# Patient Record
Sex: FEMALE | Race: WHITE | NOT HISPANIC OR LATINO | Employment: OTHER
[De-identification: names, ages, dates, MRNs, and addresses within clinical notes are randomized per-mention and may not be internally consistent; named-entity substitution may affect disease eponyms.]

---

## 2017-01-27 ENCOUNTER — HOSPITAL (OUTPATIENT)
Dept: OTHER | Age: 72
End: 2017-01-27
Attending: INTERNAL MEDICINE

## 2017-07-10 PROBLEM — E11.9 DIABETES MELLITUS TYPE 2 WITHOUT RETINOPATHY (HCC): Status: ACTIVE | Noted: 2017-07-10

## 2017-07-10 PROBLEM — D31.31 CHOROIDAL NEVUS OF RIGHT EYE: Status: ACTIVE | Noted: 2017-07-10

## 2017-07-10 PROBLEM — H25.813 COMBINED FORMS OF AGE-RELATED CATARACT OF BOTH EYES: Status: ACTIVE | Noted: 2017-07-10

## 2017-07-21 PROCEDURE — 82043 UR ALBUMIN QUANTITATIVE: CPT | Performed by: INTERNAL MEDICINE

## 2017-07-21 PROCEDURE — 82607 VITAMIN B-12: CPT | Performed by: INTERNAL MEDICINE

## 2017-07-21 PROCEDURE — 82570 ASSAY OF URINE CREATININE: CPT | Performed by: INTERNAL MEDICINE

## 2017-09-19 PROBLEM — N32.81 OAB (OVERACTIVE BLADDER): Status: ACTIVE | Noted: 2017-09-19

## 2017-09-19 PROBLEM — N39.41 URGE INCONTINENCE OF URINE: Status: ACTIVE | Noted: 2017-09-19

## 2017-10-03 PROBLEM — D31.31 CHOROIDAL NEVUS OF RIGHT EYE: Status: RESOLVED | Noted: 2017-07-10 | Resolved: 2017-10-03

## 2017-10-03 PROBLEM — N39.41 URGE INCONTINENCE OF URINE: Status: RESOLVED | Noted: 2017-09-19 | Resolved: 2017-10-03

## 2017-10-03 PROBLEM — H25.813 COMBINED FORMS OF AGE-RELATED CATARACT OF BOTH EYES: Status: RESOLVED | Noted: 2017-07-10 | Resolved: 2017-10-03

## 2018-02-26 ENCOUNTER — HOSPITAL (OUTPATIENT)
Dept: OTHER | Age: 73
End: 2018-02-26
Attending: INTERNAL MEDICINE

## 2018-08-09 PROBLEM — H02.403 PTOSIS OF BOTH EYELIDS: Status: ACTIVE | Noted: 2018-08-09

## 2018-08-22 PROCEDURE — 82570 ASSAY OF URINE CREATININE: CPT | Performed by: INTERNAL MEDICINE

## 2018-08-22 PROCEDURE — 82043 UR ALBUMIN QUANTITATIVE: CPT | Performed by: INTERNAL MEDICINE

## 2018-11-20 PROBLEM — N76.3 CHRONIC VULVITIS: Status: ACTIVE | Noted: 2018-11-20

## 2019-04-08 ENCOUNTER — HOSPITAL (OUTPATIENT)
Dept: OTHER | Age: 74
End: 2019-04-08
Attending: INTERNAL MEDICINE

## 2019-08-01 PROBLEM — H02.403 PTOSIS OF BOTH EYELIDS: Status: RESOLVED | Noted: 2018-08-09 | Resolved: 2019-08-01

## 2019-08-01 PROBLEM — N76.3 CHRONIC VULVITIS: Status: RESOLVED | Noted: 2018-11-20 | Resolved: 2019-08-01

## 2019-08-01 PROBLEM — D31.31 CHOROIDAL NEVUS OF RIGHT EYE: Status: RESOLVED | Noted: 2017-07-10 | Resolved: 2019-08-01

## 2020-06-29 PROBLEM — M16.11 PRIMARY OSTEOARTHRITIS OF RIGHT HIP: Status: ACTIVE | Noted: 2020-06-29

## 2020-06-29 PROBLEM — M25.551 ACUTE PAIN OF RIGHT HIP: Status: ACTIVE | Noted: 2020-06-29

## 2020-06-29 PROBLEM — M54.41 CHRONIC RIGHT-SIDED LOW BACK PAIN WITH RIGHT-SIDED SCIATICA: Status: ACTIVE | Noted: 2020-06-29

## 2020-06-29 PROBLEM — M43.10 SPONDYLOLISTHESIS, GRADE 1: Status: ACTIVE | Noted: 2020-06-29

## 2020-06-29 PROBLEM — G89.29 CHRONIC RIGHT-SIDED LOW BACK PAIN WITH RIGHT-SIDED SCIATICA: Status: ACTIVE | Noted: 2020-06-29

## 2020-08-14 PROBLEM — M25.551 ACUTE PAIN OF RIGHT HIP: Status: RESOLVED | Noted: 2020-06-29 | Resolved: 2020-08-14

## 2020-08-14 PROBLEM — H25.813 COMBINED FORMS OF AGE-RELATED CATARACT OF BOTH EYES: Status: RESOLVED | Noted: 2017-07-10 | Resolved: 2020-08-14

## 2020-08-18 DIAGNOSIS — Z12.31 ENCOUNTER FOR SCREENING MAMMOGRAM FOR MALIGNANT NEOPLASM OF BREAST: Primary | ICD-10-CM

## 2020-08-29 ENCOUNTER — HOSPITAL ENCOUNTER (OUTPATIENT)
Dept: MAMMOGRAPHY | Age: 75
Discharge: HOME | End: 2020-08-29

## 2020-08-29 DIAGNOSIS — Z12.31 ENCOUNTER FOR SCREENING MAMMOGRAM FOR MALIGNANT NEOPLASM OF BREAST: ICD-10-CM

## 2020-08-29 PROCEDURE — 77063 BREAST TOMOSYNTHESIS BI: CPT

## 2020-09-03 ENCOUNTER — HOSPITAL ENCOUNTER (OUTPATIENT)
Dept: MAMMOGRAPHY | Age: 75
Discharge: HOME | End: 2020-09-03

## 2020-09-03 ENCOUNTER — HOSPITAL ENCOUNTER (OUTPATIENT)
Dept: ULTRASOUND IMAGING | Age: 75
Discharge: HOME | End: 2020-09-03

## 2020-09-03 DIAGNOSIS — N64.89 BREAST ASYMMETRY: ICD-10-CM

## 2020-09-03 DIAGNOSIS — R92.8 ABNORMAL MAMMOGRAM: ICD-10-CM

## 2020-09-03 PROCEDURE — G0279 TOMOSYNTHESIS, MAMMO: HCPCS

## 2020-09-03 PROCEDURE — 76642 ULTRASOUND BREAST LIMITED: CPT

## 2020-09-09 ENCOUNTER — HOSPITAL ENCOUNTER (OUTPATIENT)
Dept: ULTRASOUND IMAGING | Age: 75
Discharge: HOME | End: 2020-09-09

## 2020-09-09 ENCOUNTER — HOSPITAL ENCOUNTER (OUTPATIENT)
Dept: MAMMOGRAPHY | Age: 75
Discharge: HOME | End: 2020-09-09

## 2020-09-09 DIAGNOSIS — N63.20 LEFT BREAST MASS: ICD-10-CM

## 2020-09-09 PROCEDURE — A4648 IMPLANTABLE TISSUE MARKER: HCPCS

## 2020-09-09 PROCEDURE — 10006027 HB SUPPLY 278

## 2020-09-09 PROCEDURE — 88341 IMHCHEM/IMCYTCHM EA ADD ANTB: CPT

## 2020-09-09 PROCEDURE — 88360 TUMOR IMMUNOHISTOCHEM/MANUAL: CPT

## 2020-09-09 PROCEDURE — 19083 BX BREAST 1ST LESION US IMAG: CPT

## 2020-09-09 PROCEDURE — 77065 DX MAMMO INCL CAD UNI: CPT

## 2020-09-09 PROCEDURE — 10006023 HB SUPPLY 272

## 2020-09-09 PROCEDURE — 88305 TISSUE EXAM BY PATHOLOGIST: CPT

## 2020-09-09 PROCEDURE — 88342 IMHCHEM/IMCYTCHM 1ST ANTB: CPT

## 2020-09-09 PROCEDURE — 88377 M/PHMTRC ALYS ISHQUANT/SEMIQ: CPT

## 2020-09-09 RX ORDER — LIDOCAINE HYDROCHLORIDE 20 MG/ML
3 INJECTION, SOLUTION INFILTRATION; PERINEURAL ONCE
Status: DISCONTINUED | OUTPATIENT
Start: 2020-09-09 | End: 2020-09-11 | Stop reason: HOSPADM

## 2020-09-14 ENCOUNTER — TELEPHONE (OUTPATIENT)
Dept: MAMMOGRAPHY | Age: 75
End: 2020-09-14

## 2020-09-14 ENCOUNTER — TELEPHONE (OUTPATIENT)
Dept: ONCOLOGY | Age: 75
End: 2020-09-14

## 2020-09-15 LAB — GENETICS REPORT: NORMAL

## 2020-09-18 LAB — PATHOLOGIST NAME: NORMAL

## 2020-10-28 ENCOUNTER — LAB SERVICES (OUTPATIENT)
Dept: LAB | Age: 75
End: 2020-10-28

## 2020-10-28 DIAGNOSIS — Z01.812 PRE-PROCEDURAL LABORATORY EXAMINATIONS: ICD-10-CM

## 2020-10-29 LAB
SARS-COV-2 RNA RESP QL NAA+PROBE: NOT DETECTED
SERVICE CMNT-IMP: NORMAL
SPECIMEN SOURCE: NORMAL

## 2020-10-29 RX ORDER — ATORVASTATIN CALCIUM 20 MG/1
20 TABLET, FILM COATED ORAL DAILY
COMMUNITY

## 2020-10-29 RX ORDER — INDAPAMIDE 2.5 MG/1
2.5 TABLET ORAL EVERY MORNING
COMMUNITY

## 2020-10-29 RX ORDER — OLMESARTAN MEDOXOMIL 20 MG/1
20 TABLET ORAL DAILY
COMMUNITY

## 2020-10-29 RX ORDER — MULTIVIT-MIN/IRON/FOLIC ACID/K 18-600-40
100 CAPSULE ORAL DAILY
COMMUNITY

## 2020-10-29 SDOH — HEALTH STABILITY: MENTAL HEALTH: HOW OFTEN DO YOU HAVE A DRINK CONTAINING ALCOHOL?: NEVER

## 2020-10-29 ASSESSMENT — ACTIVITIES OF DAILY LIVING (ADL)
MOBILITY_ASSIST_DEVICES: CANE;FRONT-WHEELED WALKER
SENSORY_SUPPORT_DEVICES: EYEGLASSES

## 2020-10-29 ASSESSMENT — COGNITIVE AND FUNCTIONAL STATUS - GENERAL: ARE YOU DEAF OR DO YOU HAVE SERIOUS DIFFICULTY  HEARING: YES

## 2020-10-30 ENCOUNTER — APPOINTMENT (OUTPATIENT)
Dept: ULTRASOUND IMAGING | Age: 75
End: 2020-10-30
Attending: SURGERY

## 2020-10-30 ENCOUNTER — ANESTHESIA (OUTPATIENT)
Dept: SURGERY | Age: 75
End: 2020-10-30

## 2020-10-30 ENCOUNTER — APPOINTMENT (OUTPATIENT)
Dept: GENERAL RADIOLOGY | Age: 75
End: 2020-10-30

## 2020-10-30 ENCOUNTER — HOSPITAL ENCOUNTER (OUTPATIENT)
Dept: MAMMOGRAPHY | Age: 75
Discharge: HOME | End: 2020-10-30
Attending: SURGERY

## 2020-10-30 ENCOUNTER — APPOINTMENT (OUTPATIENT)
Dept: MAMMOGRAPHY | Age: 75
End: 2020-10-30
Attending: SURGERY

## 2020-10-30 ENCOUNTER — HOSPITAL ENCOUNTER (OUTPATIENT)
Age: 75
Discharge: HOME | End: 2020-10-30
Attending: SURGERY | Admitting: SURGERY

## 2020-10-30 ENCOUNTER — HOSPITAL ENCOUNTER (OUTPATIENT)
Dept: NUCLEAR MEDICINE | Age: 75
Discharge: HOME | End: 2020-10-30
Attending: SURGERY | Admitting: SURGERY

## 2020-10-30 ENCOUNTER — ANESTHESIA EVENT (OUTPATIENT)
Dept: SURGERY | Age: 75
End: 2020-10-30

## 2020-10-30 VITALS
HEART RATE: 56 BPM | DIASTOLIC BLOOD PRESSURE: 54 MMHG | OXYGEN SATURATION: 94 % | BODY MASS INDEX: 41.71 KG/M2 | RESPIRATION RATE: 16 BRPM | WEIGHT: 226.63 LBS | HEIGHT: 62 IN | SYSTOLIC BLOOD PRESSURE: 133 MMHG | TEMPERATURE: 97.5 F

## 2020-10-30 DIAGNOSIS — Z17.0 MALIGNANT NEOPLASM OF CENTRAL PORTION OF LEFT BREAST IN FEMALE, ESTROGEN RECEPTOR POSITIVE (CMD): ICD-10-CM

## 2020-10-30 DIAGNOSIS — C50.112 MALIGNANT NEOPLASM OF CENTRAL PORTION OF LEFT BREAST IN FEMALE, ESTROGEN RECEPTOR POSITIVE (CMD): ICD-10-CM

## 2020-10-30 DIAGNOSIS — Z85.3 HISTORY OF LEFT BREAST CANCER: ICD-10-CM

## 2020-10-30 DIAGNOSIS — Z85.3 HISTORY OF BREAST CANCER: ICD-10-CM

## 2020-10-30 DIAGNOSIS — Z01.812 PRE-PROCEDURAL LABORATORY EXAMINATIONS: Primary | ICD-10-CM

## 2020-10-30 LAB
GLUCOSE BLDC GLUCOMTR-MCNC: 118 MG/DL (ref 70–99)
GLUCOSE BLDC GLUCOMTR-MCNC: 127 MG/DL (ref 70–99)

## 2020-10-30 PROCEDURE — 82962 GLUCOSE BLOOD TEST: CPT

## 2020-10-30 PROCEDURE — 10002807 HB RX 258

## 2020-10-30 PROCEDURE — 10002803 HB RX 637

## 2020-10-30 PROCEDURE — 13000001 HB PHASE II RECOVERY EA 30 MINUTES: Performed by: SURGERY

## 2020-10-30 PROCEDURE — 13000037 HB COMPLEX CASE EACH ADD MINUTE: Performed by: SURGERY

## 2020-10-30 PROCEDURE — 10002801 HB RX 250 W/O HCPCS: Performed by: SURGERY

## 2020-10-30 PROCEDURE — 10002800 HB RX 250 W HCPCS

## 2020-10-30 PROCEDURE — 77065 DX MAMMO INCL CAD UNI: CPT

## 2020-10-30 PROCEDURE — 76098 X-RAY EXAM SURGICAL SPECIMEN: CPT

## 2020-10-30 PROCEDURE — 10002801 HB RX 250 W/O HCPCS

## 2020-10-30 PROCEDURE — 10002803 HB RX 637: Performed by: SURGERY

## 2020-10-30 PROCEDURE — 88342 IMHCHEM/IMCYTCHM 1ST ANTB: CPT

## 2020-10-30 PROCEDURE — 78195 LYMPH SYSTEM IMAGING: CPT

## 2020-10-30 PROCEDURE — 10006027 HB SUPPLY 278: Performed by: SURGERY

## 2020-10-30 PROCEDURE — 13000036 HB COMPLEX  CASE S/U + 1ST 15 MIN: Performed by: SURGERY

## 2020-10-30 PROCEDURE — 13000003 HB ANESTHESIA  GENERAL EA ADD MINUTE: Performed by: SURGERY

## 2020-10-30 PROCEDURE — 10006023 HB SUPPLY 272: Performed by: SURGERY

## 2020-10-30 PROCEDURE — 13000002 HB ANESTHESIA  GENERAL  S/U + 1ST 15 MIN: Performed by: SURGERY

## 2020-10-30 PROCEDURE — A9520 TC99 TILMANOCEPT DIAG 0.5MCI: HCPCS | Performed by: SURGERY

## 2020-10-30 PROCEDURE — 19281 PERQ DEVICE BREAST 1ST IMAG: CPT

## 2020-10-30 PROCEDURE — 71045 X-RAY EXAM CHEST 1 VIEW: CPT

## 2020-10-30 PROCEDURE — 10004452 HB PACU ADDL 30 MINUTES: Performed by: SURGERY

## 2020-10-30 PROCEDURE — 19285 PERQ DEV BREAST 1ST US IMAG: CPT

## 2020-10-30 PROCEDURE — 10006150 HB RX 343: Performed by: SURGERY

## 2020-10-30 PROCEDURE — 10004451 HB PACU RECOVERY 1ST 30 MINUTES: Performed by: SURGERY

## 2020-10-30 PROCEDURE — 88307 TISSUE EXAM BY PATHOLOGIST: CPT

## 2020-10-30 DEVICE — IMPLANTABLE DEVICE: Type: IMPLANTABLE DEVICE | Site: BREAST | Status: FUNCTIONAL

## 2020-10-30 RX ORDER — SCOLOPAMINE TRANSDERMAL SYSTEM 1 MG/1
1 PATCH, EXTENDED RELEASE TRANSDERMAL PRN
Status: DISCONTINUED | OUTPATIENT
Start: 2020-10-30 | End: 2020-10-30 | Stop reason: HOSPADM

## 2020-10-30 RX ORDER — NICOTINE POLACRILEX 4 MG
30 LOZENGE BUCCAL
Status: DISCONTINUED | OUTPATIENT
Start: 2020-10-30 | End: 2020-10-30 | Stop reason: HOSPADM

## 2020-10-30 RX ORDER — LIDOCAINE HYDROCHLORIDE 20 MG/ML
INJECTION, SOLUTION INFILTRATION; PERINEURAL PRN
Status: DISCONTINUED | OUTPATIENT
Start: 2020-10-30 | End: 2020-10-30

## 2020-10-30 RX ORDER — DEXTROSE MONOHYDRATE 25 G/50ML
25 INJECTION, SOLUTION INTRAVENOUS PRN
Status: DISCONTINUED | OUTPATIENT
Start: 2020-10-30 | End: 2020-10-30 | Stop reason: HOSPADM

## 2020-10-30 RX ORDER — SODIUM CHLORIDE, SODIUM LACTATE, POTASSIUM CHLORIDE, CALCIUM CHLORIDE 600; 310; 30; 20 MG/100ML; MG/100ML; MG/100ML; MG/100ML
INJECTION, SOLUTION INTRAVENOUS CONTINUOUS
Status: DISCONTINUED | OUTPATIENT
Start: 2020-10-30 | End: 2020-10-30 | Stop reason: HOSPADM

## 2020-10-30 RX ORDER — HYDRALAZINE HYDROCHLORIDE 20 MG/ML
5 INJECTION INTRAMUSCULAR; INTRAVENOUS EVERY 10 MIN PRN
Status: DISCONTINUED | OUTPATIENT
Start: 2020-10-30 | End: 2020-10-30 | Stop reason: HOSPADM

## 2020-10-30 RX ORDER — LIDOCAINE HYDROCHLORIDE 10 MG/ML
5-10 INJECTION, SOLUTION INFILTRATION; PERINEURAL PRN
Status: DISCONTINUED | OUTPATIENT
Start: 2020-10-30 | End: 2020-10-30 | Stop reason: HOSPADM

## 2020-10-30 RX ORDER — ONDANSETRON 2 MG/ML
INJECTION INTRAMUSCULAR; INTRAVENOUS PRN
Status: DISCONTINUED | OUTPATIENT
Start: 2020-10-30 | End: 2020-10-30

## 2020-10-30 RX ORDER — SODIUM CHLORIDE, SODIUM LACTATE, POTASSIUM CHLORIDE, CALCIUM CHLORIDE 600; 310; 30; 20 MG/100ML; MG/100ML; MG/100ML; MG/100ML
INJECTION, SOLUTION INTRAVENOUS CONTINUOUS PRN
Status: DISCONTINUED | OUTPATIENT
Start: 2020-10-30 | End: 2020-10-30

## 2020-10-30 RX ORDER — BUPIVACAINE HYDROCHLORIDE 2.5 MG/ML
INJECTION, SOLUTION EPIDURAL; INFILTRATION; INTRACAUDAL PRN
Status: DISCONTINUED | OUTPATIENT
Start: 2020-10-30 | End: 2020-10-30 | Stop reason: HOSPADM

## 2020-10-30 RX ORDER — DIPHENHYDRAMINE HYDROCHLORIDE 50 MG/ML
25 INJECTION INTRAMUSCULAR; INTRAVENOUS
Status: DISCONTINUED | OUTPATIENT
Start: 2020-10-30 | End: 2020-10-30 | Stop reason: HOSPADM

## 2020-10-30 RX ORDER — HUMAN INSULIN 100 [IU]/ML
INJECTION, SOLUTION SUBCUTANEOUS
Status: DISCONTINUED | OUTPATIENT
Start: 2020-10-30 | End: 2020-10-30 | Stop reason: HOSPADM

## 2020-10-30 RX ORDER — ALBUTEROL SULFATE 2.5 MG/3ML
5 SOLUTION RESPIRATORY (INHALATION) ONCE
Status: DISCONTINUED | OUTPATIENT
Start: 2020-10-30 | End: 2020-10-30 | Stop reason: HOSPADM

## 2020-10-30 RX ORDER — FAMOTIDINE 20 MG/1
20 TABLET, FILM COATED ORAL
Status: COMPLETED | OUTPATIENT
Start: 2020-10-30 | End: 2020-10-30

## 2020-10-30 RX ORDER — METOCLOPRAMIDE HYDROCHLORIDE 5 MG/ML
5 INJECTION INTRAMUSCULAR; INTRAVENOUS EVERY 6 HOURS PRN
Status: DISCONTINUED | OUTPATIENT
Start: 2020-10-30 | End: 2020-10-30 | Stop reason: HOSPADM

## 2020-10-30 RX ORDER — 0.9 % SODIUM CHLORIDE 0.9 %
2 VIAL (ML) INJECTION EVERY 12 HOURS SCHEDULED
Status: DISCONTINUED | OUTPATIENT
Start: 2020-10-30 | End: 2020-10-30 | Stop reason: HOSPADM

## 2020-10-30 RX ORDER — PROCHLORPERAZINE EDISYLATE 5 MG/ML
5 INJECTION INTRAMUSCULAR; INTRAVENOUS EVERY 4 HOURS PRN
Status: DISCONTINUED | OUTPATIENT
Start: 2020-10-30 | End: 2020-10-30 | Stop reason: HOSPADM

## 2020-10-30 RX ORDER — ONDANSETRON 2 MG/ML
4 INJECTION INTRAMUSCULAR; INTRAVENOUS ONCE
Status: DISCONTINUED | OUTPATIENT
Start: 2020-10-30 | End: 2020-10-30 | Stop reason: HOSPADM

## 2020-10-30 RX ORDER — MIDAZOLAM HYDROCHLORIDE 1 MG/ML
INJECTION, SOLUTION INTRAMUSCULAR; INTRAVENOUS PRN
Status: DISCONTINUED | OUTPATIENT
Start: 2020-10-30 | End: 2020-10-30

## 2020-10-30 RX ORDER — PROPOFOL 10 MG/ML
INJECTION, EMULSION INTRAVENOUS PRN
Status: DISCONTINUED | OUTPATIENT
Start: 2020-10-30 | End: 2020-10-30

## 2020-10-30 RX ORDER — NALOXONE HCL 0.4 MG/ML
0.2 VIAL (ML) INJECTION EVERY 5 MIN PRN
Status: DISCONTINUED | OUTPATIENT
Start: 2020-10-30 | End: 2020-10-30 | Stop reason: HOSPADM

## 2020-10-30 RX ADMIN — FAMOTIDINE 20 MG: 20 TABLET, FILM COATED ORAL at 11:10

## 2020-10-30 RX ADMIN — CEFAZOLIN SODIUM 2000 MG: 300 INJECTION, POWDER, LYOPHILIZED, FOR SOLUTION INTRAVENOUS at 13:08

## 2020-10-30 RX ADMIN — ONDANSETRON 4 MG: 2 INJECTION INTRAMUSCULAR; INTRAVENOUS at 13:40

## 2020-10-30 RX ADMIN — LIDOCAINE HYDROCHLORIDE 5 ML: 20 INJECTION, SOLUTION INFILTRATION; PERINEURAL at 12:55

## 2020-10-30 RX ADMIN — FENTANYL CITRATE 50 MCG: 50 INJECTION INTRAMUSCULAR; INTRAVENOUS at 14:15

## 2020-10-30 RX ADMIN — SODIUM CHLORIDE, POTASSIUM CHLORIDE, SODIUM LACTATE AND CALCIUM CHLORIDE: 600; 310; 30; 20 INJECTION, SOLUTION INTRAVENOUS at 12:50

## 2020-10-30 RX ADMIN — SODIUM CHLORIDE, POTASSIUM CHLORIDE, SODIUM LACTATE AND CALCIUM CHLORIDE: 600; 310; 30; 20 INJECTION, SOLUTION INTRAVENOUS at 11:10

## 2020-10-30 RX ADMIN — KETOROLAC TROMETHAMINE 30 MG: 30 INJECTION, SOLUTION INTRAMUSCULAR at 15:05

## 2020-10-30 RX ADMIN — PROPOFOL 200 MG: 10 INJECTION, EMULSION INTRAVENOUS at 12:55

## 2020-10-30 RX ADMIN — FENTANYL CITRATE 100 MCG: 50 INJECTION, SOLUTION INTRAMUSCULAR; INTRAVENOUS at 12:56

## 2020-10-30 RX ADMIN — LABETALOL 20 MG/4 ML (5 MG/ML) INTRAVENOUS SYRINGE 5 MG: at 14:04

## 2020-10-30 RX ADMIN — TILMANOCEPT 0.51 MILLICURIE: KIT at 09:20

## 2020-10-30 RX ADMIN — FENTANYL CITRATE 50 MCG: 50 INJECTION, SOLUTION INTRAMUSCULAR; INTRAVENOUS at 13:00

## 2020-10-30 RX ADMIN — MIDAZOLAM HYDROCHLORIDE 2 MG: 1 INJECTION, SOLUTION INTRAMUSCULAR; INTRAVENOUS at 12:52

## 2020-10-30 RX ADMIN — LABETALOL 20 MG/4 ML (5 MG/ML) INTRAVENOUS SYRINGE 5 MG: at 14:14

## 2020-10-30 SDOH — HEALTH STABILITY: MENTAL HEALTH: HOW OFTEN DO YOU HAVE A DRINK CONTAINING ALCOHOL?: NEVER

## 2020-10-30 ASSESSMENT — PAIN SCALES - GENERAL
PAINLEVEL_OUTOF10: 3
PAINLEVEL_OUTOF10: 5
PAINLEVEL_OUTOF10: 4
PAINLEVEL_OUTOF10: 5
PAINLEVEL_OUTOF10: 0
PAINLEVEL_OUTOF10: 5

## 2020-11-04 LAB — PATHOLOGIST NAME: NORMAL

## 2023-12-19 ENCOUNTER — TELEPHONE (OUTPATIENT)
Dept: CARDIOLOGY | Age: 78
End: 2023-12-19

## 2023-12-19 RX ORDER — OLMESARTAN MEDOXOMIL 40 MG/1
20 TABLET ORAL DAILY
Status: ON HOLD | COMMUNITY

## 2023-12-19 RX ORDER — ATORVASTATIN CALCIUM 40 MG/1
40 TABLET, FILM COATED ORAL AT BEDTIME
Status: ON HOLD | COMMUNITY

## 2023-12-19 RX ORDER — FUROSEMIDE 20 MG/1
20 TABLET ORAL DAILY
Status: ON HOLD | COMMUNITY

## 2023-12-19 RX ORDER — CLOTRIMAZOLE AND BETAMETHASONE DIPROPIONATE 10; .64 MG/G; MG/G
1 CREAM TOPICAL 2 TIMES DAILY PRN
Status: ON HOLD | COMMUNITY

## 2023-12-19 RX ORDER — FLUTICASONE PROPIONATE 50 MCG
2 SPRAY, SUSPENSION (ML) NASAL DAILY
Status: ON HOLD | COMMUNITY

## 2023-12-19 RX ORDER — MULTIVIT WITH MINERALS/LUTEIN
1000 TABLET ORAL DAILY
Status: ON HOLD | COMMUNITY

## 2023-12-19 RX ORDER — METOPROLOL TARTRATE 50 MG/1
50 TABLET, FILM COATED ORAL 2 TIMES DAILY
Status: ON HOLD | COMMUNITY
End: 2023-12-21 | Stop reason: HOSPADM

## 2023-12-20 ENCOUNTER — HOSPITAL ENCOUNTER (INPATIENT)
Dept: CARDIOLOGY | Age: 78
LOS: 6 days | Discharge: HOME | DRG: 309 | End: 2023-12-27
Attending: INTERNAL MEDICINE | Admitting: INTERNAL MEDICINE

## 2023-12-20 DIAGNOSIS — I48.92 ATRIAL FLUTTER, UNSPECIFIED TYPE (CMD): ICD-10-CM

## 2023-12-20 PROBLEM — I48.91 ATRIAL FIBRILLATION (CMD): Status: ACTIVE | Noted: 2023-12-20

## 2023-12-20 LAB
ANION GAP SERPL CALC-SCNC: 12 MMOL/L (ref 7–19)
BUN SERPL-MCNC: 26 MG/DL (ref 6–20)
BUN/CREAT SERPL: 20 (ref 7–25)
CALCIUM SERPL-MCNC: 9.1 MG/DL (ref 8.4–10.2)
CHLORIDE SERPL-SCNC: 102 MMOL/L (ref 97–110)
CO2 SERPL-SCNC: 28 MMOL/L (ref 21–32)
CREAT SERPL-MCNC: 1.31 MG/DL (ref 0.51–0.95)
DEPRECATED RDW RBC: 46 FL (ref 39–50)
EGFRCR SERPLBLD CKD-EPI 2021: 41 ML/MIN/{1.73_M2}
ERYTHROCYTE [DISTWIDTH] IN BLOOD: 13.6 % (ref 11–15)
FASTING DURATION TIME PATIENT: ABNORMAL H
GLUCOSE BLDC GLUCOMTR-MCNC: 160 MG/DL (ref 70–99)
GLUCOSE BLDC GLUCOMTR-MCNC: 178 MG/DL (ref 70–99)
GLUCOSE BLDC GLUCOMTR-MCNC: 181 MG/DL (ref 70–99)
GLUCOSE SERPL-MCNC: 173 MG/DL (ref 70–99)
HCT VFR BLD CALC: 45.3 % (ref 36–46.5)
HGB BLD-MCNC: 14.4 G/DL (ref 12–15.5)
LV EF: NORMAL %
MAGNESIUM SERPL-MCNC: 1.8 MG/DL (ref 1.7–2.4)
MCH RBC QN AUTO: 29.1 PG (ref 26–34)
MCHC RBC AUTO-ENTMCNC: 31.8 G/DL (ref 32–36.5)
MCV RBC AUTO: 91.7 FL (ref 78–100)
NRBC BLD MANUAL-RTO: 0 /100 WBC
PLATELET # BLD AUTO: 286 K/MCL (ref 140–450)
POTASSIUM SERPL-SCNC: 4 MMOL/L (ref 3.4–5.1)
RBC # BLD: 4.94 MIL/MCL (ref 4–5.2)
SODIUM SERPL-SCNC: 138 MMOL/L (ref 135–145)
WBC # BLD: 10.7 K/MCL (ref 4.2–11)

## 2023-12-20 PROCEDURE — 10004651 HB RX, NO CHARGE ITEM: Performed by: INTERNAL MEDICINE

## 2023-12-20 PROCEDURE — 96365 THER/PROPH/DIAG IV INF INIT: CPT

## 2023-12-20 PROCEDURE — 93005 ELECTROCARDIOGRAM TRACING: CPT | Performed by: INTERNAL MEDICINE

## 2023-12-20 PROCEDURE — 82962 GLUCOSE BLOOD TEST: CPT

## 2023-12-20 PROCEDURE — 5A2204Z RESTORATION OF CARDIAC RHYTHM, SINGLE: ICD-10-PCS | Performed by: FAMILY MEDICINE

## 2023-12-20 PROCEDURE — 80048 BASIC METABOLIC PNL TOTAL CA: CPT | Performed by: INTERNAL MEDICINE

## 2023-12-20 PROCEDURE — 10002807 HB RX 258: Performed by: INTERNAL MEDICINE

## 2023-12-20 PROCEDURE — 13000001 HB PHASE II RECOVERY EA 30 MINUTES: Performed by: INTERNAL MEDICINE

## 2023-12-20 PROCEDURE — 10002801 HB RX 250 W/O HCPCS: Performed by: INTERNAL MEDICINE

## 2023-12-20 PROCEDURE — 36415 COLL VENOUS BLD VENIPUNCTURE: CPT | Performed by: INTERNAL MEDICINE

## 2023-12-20 PROCEDURE — 10002800 HB RX 250 W HCPCS: Performed by: INTERNAL MEDICINE

## 2023-12-20 PROCEDURE — 96361 HYDRATE IV INFUSION ADD-ON: CPT

## 2023-12-20 PROCEDURE — 85027 COMPLETE CBC AUTOMATED: CPT | Performed by: INTERNAL MEDICINE

## 2023-12-20 PROCEDURE — 10002803 HB RX 637: Performed by: INTERNAL MEDICINE

## 2023-12-20 PROCEDURE — 96366 THER/PROPH/DIAG IV INF ADDON: CPT

## 2023-12-20 PROCEDURE — B24BZZ4 ULTRASONOGRAPHY OF HEART WITH AORTA, TRANSESOPHAGEAL: ICD-10-PCS | Performed by: FAMILY MEDICINE

## 2023-12-20 PROCEDURE — 96375 TX/PRO/DX INJ NEW DRUG ADDON: CPT

## 2023-12-20 PROCEDURE — 99152 MOD SED SAME PHYS/QHP 5/>YRS: CPT | Performed by: INTERNAL MEDICINE

## 2023-12-20 PROCEDURE — 93312 ECHO TRANSESOPHAGEAL: CPT

## 2023-12-20 PROCEDURE — 83735 ASSAY OF MAGNESIUM: CPT | Performed by: INTERNAL MEDICINE

## 2023-12-20 PROCEDURE — 96372 THER/PROPH/DIAG INJ SC/IM: CPT | Performed by: INTERNAL MEDICINE

## 2023-12-20 PROCEDURE — G0378 HOSPITAL OBSERVATION PER HR: HCPCS

## 2023-12-20 RX ORDER — POLYETHYLENE GLYCOL 3350 17 G/17G
17 POWDER, FOR SOLUTION ORAL DAILY PRN
Status: DISCONTINUED | OUTPATIENT
Start: 2023-12-20 | End: 2023-12-27 | Stop reason: HOSPADM

## 2023-12-20 RX ORDER — METFORMIN HYDROCHLORIDE 500 MG/1
500 TABLET, EXTENDED RELEASE ORAL
COMMUNITY

## 2023-12-20 RX ORDER — ONDANSETRON 2 MG/ML
4 INJECTION INTRAMUSCULAR; INTRAVENOUS EVERY 12 HOURS PRN
Status: DISCONTINUED | OUTPATIENT
Start: 2023-12-20 | End: 2023-12-20 | Stop reason: SDUPTHER

## 2023-12-20 RX ORDER — FLUMAZENIL 0.1 MG/ML
INJECTION, SOLUTION INTRAVENOUS
Status: DISCONTINUED
Start: 2023-12-20 | End: 2023-12-20 | Stop reason: WASHOUT

## 2023-12-20 RX ORDER — NALOXONE HCL 0.4 MG/ML
VIAL (ML) INJECTION
Status: DISCONTINUED
Start: 2023-12-20 | End: 2023-12-20 | Stop reason: WASHOUT

## 2023-12-20 RX ORDER — MIDAZOLAM HYDROCHLORIDE 1 MG/ML
INJECTION, SOLUTION INTRAMUSCULAR; INTRAVENOUS
Status: DISPENSED
Start: 2023-12-20 | End: 2023-12-20

## 2023-12-20 RX ORDER — ATORVASTATIN CALCIUM 40 MG/1
40 TABLET, FILM COATED ORAL AT BEDTIME
Status: DISCONTINUED | OUTPATIENT
Start: 2023-12-20 | End: 2023-12-27 | Stop reason: HOSPADM

## 2023-12-20 RX ORDER — LANOLIN ALCOHOL/MO/W.PET/CERES
3 CREAM (GRAM) TOPICAL NIGHTLY PRN
Status: DISCONTINUED | OUTPATIENT
Start: 2023-12-20 | End: 2023-12-27 | Stop reason: HOSPADM

## 2023-12-20 RX ORDER — ACETAMINOPHEN 325 MG/1
650 TABLET ORAL EVERY 4 HOURS PRN
Status: DISCONTINUED | OUTPATIENT
Start: 2023-12-20 | End: 2023-12-27 | Stop reason: HOSPADM

## 2023-12-20 RX ORDER — NICOTINE POLACRILEX 4 MG
15 LOZENGE BUCCAL PRN
Status: DISCONTINUED | OUTPATIENT
Start: 2023-12-20 | End: 2023-12-27 | Stop reason: HOSPADM

## 2023-12-20 RX ORDER — DEXTROSE MONOHYDRATE 25 G/50ML
25 INJECTION, SOLUTION INTRAVENOUS PRN
Status: DISCONTINUED | OUTPATIENT
Start: 2023-12-20 | End: 2023-12-27 | Stop reason: HOSPADM

## 2023-12-20 RX ORDER — DEXTROSE MONOHYDRATE 25 G/50ML
12.5 INJECTION, SOLUTION INTRAVENOUS PRN
Status: DISCONTINUED | OUTPATIENT
Start: 2023-12-20 | End: 2023-12-27 | Stop reason: HOSPADM

## 2023-12-20 RX ORDER — FUROSEMIDE 20 MG/1
20 TABLET ORAL DAILY
Status: DISCONTINUED | OUTPATIENT
Start: 2023-12-21 | End: 2023-12-24

## 2023-12-20 RX ORDER — DIGOXIN 250 MCG
250 TABLET ORAL ONCE
Status: COMPLETED | OUTPATIENT
Start: 2023-12-20 | End: 2023-12-20

## 2023-12-20 RX ORDER — MIDAZOLAM HYDROCHLORIDE 1 MG/ML
INJECTION, SOLUTION INTRAMUSCULAR; INTRAVENOUS PRN
Status: COMPLETED | OUTPATIENT
Start: 2023-12-20 | End: 2023-12-20

## 2023-12-20 RX ORDER — 0.9 % SODIUM CHLORIDE 0.9 %
2 VIAL (ML) INJECTION EVERY 12 HOURS SCHEDULED
Status: DISCONTINUED | OUTPATIENT
Start: 2023-12-20 | End: 2023-12-27 | Stop reason: HOSPADM

## 2023-12-20 RX ORDER — ACETAMINOPHEN 650 MG/1
650 SUPPOSITORY RECTAL EVERY 4 HOURS PRN
Status: DISCONTINUED | OUTPATIENT
Start: 2023-12-20 | End: 2023-12-27 | Stop reason: HOSPADM

## 2023-12-20 RX ORDER — METOPROLOL TARTRATE 1 MG/ML
5 INJECTION, SOLUTION INTRAVENOUS ONCE
Status: COMPLETED | OUTPATIENT
Start: 2023-12-20 | End: 2023-12-20

## 2023-12-20 RX ORDER — ONDANSETRON 2 MG/ML
4 INJECTION INTRAMUSCULAR; INTRAVENOUS EVERY 12 HOURS PRN
Status: DISCONTINUED | OUTPATIENT
Start: 2023-12-20 | End: 2023-12-27 | Stop reason: HOSPADM

## 2023-12-20 RX ORDER — ONDANSETRON 2 MG/ML
INJECTION INTRAMUSCULAR; INTRAVENOUS
Status: DISPENSED
Start: 2023-12-20 | End: 2023-12-21

## 2023-12-20 RX ORDER — ONDANSETRON 4 MG/1
4 TABLET, ORALLY DISINTEGRATING ORAL EVERY 12 HOURS PRN
Status: DISCONTINUED | OUTPATIENT
Start: 2023-12-20 | End: 2023-12-27 | Stop reason: HOSPADM

## 2023-12-20 RX ORDER — NICOTINE POLACRILEX 4 MG
30 LOZENGE BUCCAL PRN
Status: DISCONTINUED | OUTPATIENT
Start: 2023-12-20 | End: 2023-12-27 | Stop reason: HOSPADM

## 2023-12-20 RX ORDER — METOPROLOL TARTRATE 50 MG/1
50 TABLET, FILM COATED ORAL 2 TIMES DAILY
Status: DISCONTINUED | OUTPATIENT
Start: 2023-12-20 | End: 2023-12-21

## 2023-12-20 RX ADMIN — SODIUM CHLORIDE 500 ML: 0.9 INJECTION, SOLUTION INTRAVENOUS at 14:30

## 2023-12-20 RX ADMIN — FENTANYL CITRATE 25 MCG: 50 INJECTION INTRAMUSCULAR; INTRAVENOUS at 10:24

## 2023-12-20 RX ADMIN — FENTANYL CITRATE 25 MCG: 50 INJECTION INTRAMUSCULAR; INTRAVENOUS at 10:07

## 2023-12-20 RX ADMIN — INSULIN LISPRO 1 UNITS: 100 INJECTION, SOLUTION INTRAVENOUS; SUBCUTANEOUS at 18:37

## 2023-12-20 RX ADMIN — AMIODARONE HYDROCHLORIDE 1 MG/MIN: 1.8 INJECTION, SOLUTION INTRAVENOUS at 16:11

## 2023-12-20 RX ADMIN — MIDAZOLAM HYDROCHLORIDE 1 MG: 1 INJECTION, SOLUTION INTRAMUSCULAR; INTRAVENOUS at 10:09

## 2023-12-20 RX ADMIN — MIDAZOLAM HYDROCHLORIDE 1 MG: 1 INJECTION, SOLUTION INTRAMUSCULAR; INTRAVENOUS at 10:24

## 2023-12-20 RX ADMIN — SODIUM CHLORIDE, PRESERVATIVE FREE 2 ML: 5 INJECTION INTRAVENOUS at 19:56

## 2023-12-20 RX ADMIN — METOPROLOL TARTRATE 5 MG: 1 INJECTION, SOLUTION INTRAVENOUS at 11:33

## 2023-12-20 RX ADMIN — AMIODARONE HYDROCHLORIDE 150 MG: 1.5 INJECTION, SOLUTION INTRAVENOUS at 13:58

## 2023-12-20 RX ADMIN — ONDANSETRON 4 MG: 2 INJECTION INTRAMUSCULAR; INTRAVENOUS at 13:24

## 2023-12-20 RX ADMIN — SODIUM CHLORIDE 500 ML: 9 INJECTION, SOLUTION INTRAVENOUS at 18:37

## 2023-12-20 RX ADMIN — SODIUM CHLORIDE, PRESERVATIVE FREE 2 ML: 5 INJECTION INTRAVENOUS at 21:30

## 2023-12-20 RX ADMIN — MIDAZOLAM HYDROCHLORIDE 1 MG: 1 INJECTION, SOLUTION INTRAMUSCULAR; INTRAVENOUS at 10:07

## 2023-12-20 RX ADMIN — DIGOXIN 250 MCG: 250 TABLET ORAL at 22:38

## 2023-12-20 RX ADMIN — FENTANYL CITRATE 25 MCG: 50 INJECTION INTRAMUSCULAR; INTRAVENOUS at 10:09

## 2023-12-20 RX ADMIN — APIXABAN 5 MG: 5 TABLET, FILM COATED ORAL at 21:30

## 2023-12-20 RX ADMIN — ATORVASTATIN CALCIUM 40 MG: 40 TABLET, FILM COATED ORAL at 21:30

## 2023-12-20 RX ADMIN — AMIODARONE HYDROCHLORIDE 1 MG/MIN: 1.8 INJECTION, SOLUTION INTRAVENOUS at 14:12

## 2023-12-20 RX ADMIN — ONDANSETRON 4 MG: 2 INJECTION INTRAMUSCULAR; INTRAVENOUS at 19:53

## 2023-12-20 SDOH — SOCIAL STABILITY: SOCIAL NETWORK
HOW OFTEN DO YOU SEE OR TALK TO PEOPLE THAT YOU CARE ABOUT AND FEEL CLOSE TO? (FOR EXAMPLE: TALKING TO FRIENDS ON THE PHONE, VISITING FRIENDS OR FAMILY, GOING TO CHURCH OR CLUB MEETINGS): PATIENT DECLINED

## 2023-12-20 SDOH — SOCIAL STABILITY: SOCIAL INSECURITY: HOW OFTEN DOES ANYONE, INCLUDING FAMILY AND FRIENDS, SCREAM OR CURSE AT YOU?: NEVER

## 2023-12-20 SDOH — ECONOMIC STABILITY: HOUSING INSECURITY: WHAT IS YOUR LIVING SITUATION TODAY?: I HAVE A STEADY PLACE TO LIVE

## 2023-12-20 SDOH — ECONOMIC STABILITY: GENERAL: WOULD YOU LIKE HELP WITH ANY OF THE FOLLOWING NEEDS?: I DON'T WANT HELP WITH ANY OF THESE

## 2023-12-20 SDOH — ECONOMIC STABILITY: INCOME INSECURITY: IN THE PAST 12 MONTHS, HAS THE ELECTRIC, GAS, OIL, OR WATER COMPANY THREATENED TO SHUT OFF SERVICE IN YOUR HOME?: NO

## 2023-12-20 SDOH — ECONOMIC STABILITY: HOUSING INSECURITY: WHAT IS YOUR LIVING SITUATION TODAY?: HOUSE

## 2023-12-20 SDOH — SOCIAL STABILITY: SOCIAL INSECURITY: HOW OFTEN DOES ANYONE, INCLUDING FAMILY AND FRIENDS, PHYSICALLY HURT YOU?: NEVER

## 2023-12-20 SDOH — SOCIAL STABILITY: SOCIAL NETWORK: SUPPORT SYSTEMS: FAMILY MEMBERS

## 2023-12-20 SDOH — ECONOMIC STABILITY: HOUSING INSECURITY: WHAT IS YOUR LIVING SITUATION TODAY?: ALONE

## 2023-12-20 SDOH — ECONOMIC STABILITY: GENERAL

## 2023-12-20 SDOH — HEALTH STABILITY: PHYSICAL HEALTH: DO YOU HAVE DIFFICULTY DRESSING OR BATHING?: NO

## 2023-12-20 SDOH — HEALTH STABILITY: PHYSICAL HEALTH: DO YOU HAVE SERIOUS DIFFICULTY WALKING OR CLIMBING STAIRS?: NO

## 2023-12-20 SDOH — SOCIAL STABILITY: SOCIAL INSECURITY: HOW OFTEN DOES ANYONE, INCLUDING FAMILY AND FRIENDS, INSULT OR TALK DOWN TO YOU?: NEVER

## 2023-12-20 SDOH — ECONOMIC STABILITY: FOOD INSECURITY: WITHIN THE PAST 12 MONTHS, THE FOOD YOU BOUGHT JUST DIDN'T LAST AND YOU DIDN'T HAVE MONEY TO GET MORE.: NEVER TRUE

## 2023-12-20 SDOH — ECONOMIC STABILITY: HOUSING INSECURITY: DO YOU HAVE PROBLEMS WITH ANY OF THE FOLLOWING?: PATIENT DECLINED

## 2023-12-20 SDOH — ECONOMIC STABILITY: TRANSPORTATION INSECURITY
IN THE PAST 12 MONTHS, HAS LACK OF RELIABLE TRANSPORTATION KEPT YOU FROM MEDICAL APPOINTMENTS, MEETINGS, WORK OR FROM GETTING THINGS NEEDED FOR DAILY LIVING?: NO

## 2023-12-20 SDOH — HEALTH STABILITY: GENERAL: BECAUSE OF A PHYSICAL, MENTAL, OR EMOTIONAL CONDITION, DO YOU HAVE DIFFICULTY DOING ERRANDS ALONE?: NO

## 2023-12-20 SDOH — SOCIAL STABILITY: SOCIAL INSECURITY: HOW OFTEN DOES ANYONE, INCLUDING FAMILY AND FRIENDS, THREATEN YOU WITH HARM?: NEVER

## 2023-12-20 SDOH — HEALTH STABILITY: GENERAL
BECAUSE OF A PHYSICAL, MENTAL, OR EMOTIONAL CONDITION, DO YOU HAVE SERIOUS DIFFICULTY CONCENTRATING, REMEMBERING OR MAKING DECISIONS?: NO

## 2023-12-20 ASSESSMENT — ACTIVITIES OF DAILY LIVING (ADL)
RECENT_DECLINE_ADL: NO
ADL_SHORT_OF_BREATH: YES
ADL_BEFORE_ADMISSION: INDEPENDENT
ADL_SCORE: 12

## 2023-12-20 ASSESSMENT — LIFESTYLE VARIABLES
SMOKING_HISTORY: NO
HOW OFTEN DO YOU HAVE 6 OR MORE DRINKS ON ONE OCCASION: NEVER
HOW MANY STANDARD DRINKS CONTAINING ALCOHOL DO YOU HAVE ON A TYPICAL DAY: 0,1 OR 2
HOW OFTEN DO YOU HAVE A DRINK CONTAINING ALCOHOL: NEVER
ALCOHOL_USE_STATUS: NO OR LOW RISK WITH VALIDATED TOOL
AUDIT-C TOTAL SCORE: 0

## 2023-12-20 ASSESSMENT — PATIENT HEALTH QUESTIONNAIRE - PHQ9
IS PATIENT ABLE TO COMPLETE PHQ2 OR PHQ9: YES
CLINICAL INTERPRETATION OF PHQ2 SCORE: NO FURTHER SCREENING NEEDED
SUM OF ALL RESPONSES TO PHQ9 QUESTIONS 1 AND 2: 0

## 2023-12-20 ASSESSMENT — ORIENTATION MEMORY CONCENTRATION TEST (OMCT)
WHAT TIME IS IT (NO WATCH OR CLOCK): CORRECT
WHAT MONTH IS IT NOW: CORRECT
COUNT BACKWARDS FROM 20 TO 1: CORRECT
WHAT YEAR IS IT NOW (MUST BE EXACT): CORRECT
REPEAT THE NAME AND ADDRESS I ASKED YOU TO REMEMBER: 1 ERROR
OMCT INTERPRETATION: 0-6: NO SIGNIFICANT IMPAIRMENT
OMCT SCORE: 2
SAY THE MONTHS IN REVERSE ORDER STARTING WITH LAST MONTH: CORRECT

## 2023-12-20 ASSESSMENT — COGNITIVE AND FUNCTIONAL STATUS - GENERAL
DO YOU HAVE SERIOUS DIFFICULTY WALKING OR CLIMBING STAIRS: NO
BECAUSE OF A PHYSICAL, MENTAL, OR EMOTIONAL CONDITION, DO YOU HAVE DIFFICULTY DOING ERRANDS ALONE: NO
DO YOU HAVE DIFFICULTY DRESSING OR BATHING: NO
BECAUSE OF A PHYSICAL, MENTAL, OR EMOTIONAL CONDITION, DO YOU HAVE SERIOUS DIFFICULTY CONCENTRATING, REMEMBERING OR MAKING DECISIONS: NO

## 2023-12-20 ASSESSMENT — PAIN SCALES - GENERAL
PAINLEVEL_OUTOF10: 0

## 2023-12-20 ASSESSMENT — PAIN SCALES - WONG BAKER
WONGBAKER_NUMERICALRESPONSE: 0

## 2023-12-21 ENCOUNTER — ANESTHESIA EVENT (OUTPATIENT)
Dept: CARDIOLOGY | Age: 78
End: 2023-12-21

## 2023-12-21 ENCOUNTER — APPOINTMENT (OUTPATIENT)
Dept: CARDIOLOGY | Age: 78
DRG: 309 | End: 2023-12-21
Attending: INTERNAL MEDICINE

## 2023-12-21 ENCOUNTER — ANESTHESIA (OUTPATIENT)
Dept: CARDIOLOGY | Age: 78
End: 2023-12-21

## 2023-12-21 LAB
AMORPH SED URNS QL MICRO: PRESENT
ANION GAP SERPL CALC-SCNC: 13 MMOL/L (ref 7–19)
ANION GAP SERPL CALC-SCNC: 14 MMOL/L (ref 7–19)
APPEARANCE UR: ABNORMAL
ATRIAL RATE (BPM): 278
ATRIAL RATE (BPM): 79
BACTERIA #/AREA URNS HPF: ABNORMAL /HPF
BASOPHILS # BLD: 0.1 K/MCL (ref 0–0.3)
BASOPHILS NFR BLD: 1 %
BILIRUB UR QL STRIP: NEGATIVE
BUN SERPL-MCNC: 32 MG/DL (ref 6–20)
BUN SERPL-MCNC: 39 MG/DL (ref 6–20)
BUN/CREAT SERPL: 15 (ref 7–25)
BUN/CREAT SERPL: 16 (ref 7–25)
CALCIUM SERPL-MCNC: 8.7 MG/DL (ref 8.4–10.2)
CALCIUM SERPL-MCNC: 8.8 MG/DL (ref 8.4–10.2)
CHLORIDE SERPL-SCNC: 101 MMOL/L (ref 97–110)
CHLORIDE SERPL-SCNC: 103 MMOL/L (ref 97–110)
CO2 SERPL-SCNC: 26 MMOL/L (ref 21–32)
CO2 SERPL-SCNC: 26 MMOL/L (ref 21–32)
COLOR UR: YELLOW
CREAT SERPL-MCNC: 1.95 MG/DL (ref 0.51–0.95)
CREAT SERPL-MCNC: 2.52 MG/DL (ref 0.51–0.95)
DEPRECATED RDW RBC: 45.9 FL (ref 39–50)
EGFRCR SERPLBLD CKD-EPI 2021: 19 ML/MIN/{1.73_M2}
EGFRCR SERPLBLD CKD-EPI 2021: 26 ML/MIN/{1.73_M2}
EOSINOPHIL # BLD: 0.1 K/MCL (ref 0–0.5)
EOSINOPHIL NFR BLD: 1 %
ERYTHROCYTE [DISTWIDTH] IN BLOOD: 13.5 % (ref 11–15)
FASTING DURATION TIME PATIENT: ABNORMAL H
FASTING DURATION TIME PATIENT: ABNORMAL H
GLUCOSE BLDC GLUCOMTR-MCNC: 138 MG/DL (ref 70–99)
GLUCOSE BLDC GLUCOMTR-MCNC: 154 MG/DL (ref 70–99)
GLUCOSE BLDC GLUCOMTR-MCNC: 163 MG/DL (ref 70–99)
GLUCOSE SERPL-MCNC: 145 MG/DL (ref 70–99)
GLUCOSE SERPL-MCNC: 177 MG/DL (ref 70–99)
GLUCOSE UR STRIP-MCNC: ABNORMAL MG/DL
HCT VFR BLD CALC: 43.1 % (ref 36–46.5)
HGB BLD-MCNC: 13.6 G/DL (ref 12–15.5)
HGB UR QL STRIP: NEGATIVE
HYALINE CASTS #/AREA URNS LPF: ABNORMAL /LPF
IMM GRANULOCYTES # BLD AUTO: 0.1 K/MCL (ref 0–0.2)
IMM GRANULOCYTES # BLD: 1 %
KETONES UR STRIP-MCNC: NEGATIVE MG/DL
LEUKOCYTE ESTERASE UR QL STRIP: NEGATIVE
LYMPHOCYTES # BLD: 2.3 K/MCL (ref 1–4)
LYMPHOCYTES NFR BLD: 24 %
MAGNESIUM SERPL-MCNC: 1.6 MG/DL (ref 1.7–2.4)
MCH RBC QN AUTO: 28.8 PG (ref 26–34)
MCHC RBC AUTO-ENTMCNC: 31.6 G/DL (ref 32–36.5)
MCV RBC AUTO: 91.1 FL (ref 78–100)
MONOCYTES # BLD: 1.3 K/MCL (ref 0.3–0.9)
MONOCYTES NFR BLD: 13 %
NEUTROPHILS # BLD: 5.8 K/MCL (ref 1.8–7.7)
NEUTROPHILS NFR BLD: 60 %
NITRITE UR QL STRIP: NEGATIVE
NRBC BLD MANUAL-RTO: 0 /100 WBC
P AXIS (DEGREES): 109
P AXIS (DEGREES): 81
PH UR STRIP: 5.5 [PH] (ref 5–7)
PLATELET # BLD AUTO: 249 K/MCL (ref 140–450)
POTASSIUM SERPL-SCNC: 3.9 MMOL/L (ref 3.4–5.1)
POTASSIUM SERPL-SCNC: 3.9 MMOL/L (ref 3.4–5.1)
PR-INTERVAL (MSEC): 134
PR-INTERVAL (MSEC): 154
PROT UR STRIP-MCNC: 300 MG/DL
QRS-INTERVAL (MSEC): 78
QRS-INTERVAL (MSEC): 80
QRS-INTERVAL (MSEC): 80
QRS-INTERVAL (MSEC): 86
QT-INTERVAL (MSEC): 198
QT-INTERVAL (MSEC): 306
QT-INTERVAL (MSEC): 466
QT-INTERVAL (MSEC): 466
QTC: 301
QTC: 481
QTC: 487
QTC: 492
R AXIS (DEGREES): 109
R AXIS (DEGREES): 110
R AXIS (DEGREES): 110
R AXIS (DEGREES): 118
RBC # BLD: 4.73 MIL/MCL (ref 4–5.2)
RBC #/AREA URNS HPF: ABNORMAL /HPF
REPORT TEXT: NORMAL
SODIUM SERPL-SCNC: 136 MMOL/L (ref 135–145)
SODIUM SERPL-SCNC: 139 MMOL/L (ref 135–145)
SODIUM UR-SCNC: <12 MMOL/L
SP GR UR STRIP: 1.02 (ref 1–1.03)
SQUAMOUS #/AREA URNS HPF: ABNORMAL /HPF
T AXIS (DEGREES): -158
T AXIS (DEGREES): 101
T AXIS (DEGREES): 144
T AXIS (DEGREES): 76
TRANS CELLS #/AREA URNS HPF: ABNORMAL /HPF
UROBILINOGEN UR STRIP-MCNC: 0.2 MG/DL
VENTRICULAR RATE EKG/MIN (BPM): 139
VENTRICULAR RATE EKG/MIN (BPM): 152
VENTRICULAR RATE EKG/MIN (BPM): 64
VENTRICULAR RATE EKG/MIN (BPM): 67
WBC # BLD: 9.6 K/MCL (ref 4.2–11)
WBC #/AREA URNS HPF: ABNORMAL /HPF

## 2023-12-21 PROCEDURE — 10002807 HB RX 258: Performed by: INTERNAL MEDICINE

## 2023-12-21 PROCEDURE — 10002800 HB RX 250 W HCPCS: Performed by: STUDENT IN AN ORGANIZED HEALTH CARE EDUCATION/TRAINING PROGRAM

## 2023-12-21 PROCEDURE — 84300 ASSAY OF URINE SODIUM: CPT | Performed by: INTERNAL MEDICINE

## 2023-12-21 PROCEDURE — 96372 THER/PROPH/DIAG INJ SC/IM: CPT | Performed by: INTERNAL MEDICINE

## 2023-12-21 PROCEDURE — 92960 CARDIOVERSION ELECTRIC EXT: CPT

## 2023-12-21 PROCEDURE — 36415 COLL VENOUS BLD VENIPUNCTURE: CPT | Performed by: INTERNAL MEDICINE

## 2023-12-21 PROCEDURE — 10002803 HB RX 637: Performed by: INTERNAL MEDICINE

## 2023-12-21 PROCEDURE — 13000001 HB PHASE II RECOVERY EA 30 MINUTES

## 2023-12-21 PROCEDURE — 10002803 HB RX 637: Performed by: PHYSICIAN ASSISTANT

## 2023-12-21 PROCEDURE — 80048 BASIC METABOLIC PNL TOTAL CA: CPT | Performed by: INTERNAL MEDICINE

## 2023-12-21 PROCEDURE — 10002803 HB RX 637: Performed by: HOSPITALIST

## 2023-12-21 PROCEDURE — 10002800 HB RX 250 W HCPCS: Performed by: INTERNAL MEDICINE

## 2023-12-21 PROCEDURE — 93005 ELECTROCARDIOGRAM TRACING: CPT | Performed by: INTERNAL MEDICINE

## 2023-12-21 PROCEDURE — 96361 HYDRATE IV INFUSION ADD-ON: CPT

## 2023-12-21 PROCEDURE — 10002807 HB RX 258: Performed by: STUDENT IN AN ORGANIZED HEALTH CARE EDUCATION/TRAINING PROGRAM

## 2023-12-21 PROCEDURE — 81001 URINALYSIS AUTO W/SCOPE: CPT | Performed by: INTERNAL MEDICINE

## 2023-12-21 PROCEDURE — 96366 THER/PROPH/DIAG IV INF ADDON: CPT

## 2023-12-21 PROCEDURE — 85025 COMPLETE CBC W/AUTO DIFF WBC: CPT | Performed by: INTERNAL MEDICINE

## 2023-12-21 PROCEDURE — G0378 HOSPITAL OBSERVATION PER HR: HCPCS

## 2023-12-21 PROCEDURE — 10006031 HB ROOM CHARGE TELEMETRY

## 2023-12-21 PROCEDURE — 10004651 HB RX, NO CHARGE ITEM: Performed by: INTERNAL MEDICINE

## 2023-12-21 PROCEDURE — 13000008 HB ANESTHESIA MAC OUTSIDE OR

## 2023-12-21 PROCEDURE — 83735 ASSAY OF MAGNESIUM: CPT | Performed by: INTERNAL MEDICINE

## 2023-12-21 PROCEDURE — 84540 ASSAY OF URINE/UREA-N: CPT | Performed by: INTERNAL MEDICINE

## 2023-12-21 RX ORDER — SODIUM CHLORIDE, SODIUM LACTATE, POTASSIUM CHLORIDE, CALCIUM CHLORIDE 600; 310; 30; 20 MG/100ML; MG/100ML; MG/100ML; MG/100ML
INJECTION, SOLUTION INTRAVENOUS CONTINUOUS PRN
Status: DISCONTINUED | OUTPATIENT
Start: 2023-12-21 | End: 2023-12-21

## 2023-12-21 RX ORDER — SODIUM CHLORIDE 9 MG/ML
INJECTION, SOLUTION INTRAVENOUS CONTINUOUS
Status: DISCONTINUED | OUTPATIENT
Start: 2023-12-21 | End: 2023-12-23

## 2023-12-21 RX ORDER — SENNOSIDES A AND B 8.6 MG/1
1 TABLET, FILM COATED ORAL NIGHTLY PRN
Status: DISCONTINUED | OUTPATIENT
Start: 2023-12-21 | End: 2023-12-27 | Stop reason: HOSPADM

## 2023-12-21 RX ORDER — AMIODARONE HYDROCHLORIDE 200 MG/1
400 TABLET ORAL EVERY 12 HOURS SCHEDULED
Qty: 120 TABLET | Refills: 0 | Status: SHIPPED | OUTPATIENT
Start: 2023-12-21

## 2023-12-21 RX ORDER — PROPOFOL 10 MG/ML
INJECTION, EMULSION INTRAVENOUS PRN
Status: DISCONTINUED | OUTPATIENT
Start: 2023-12-21 | End: 2023-12-21

## 2023-12-21 RX ORDER — AMIODARONE HYDROCHLORIDE 200 MG/1
400 TABLET ORAL EVERY 12 HOURS SCHEDULED
Status: DISCONTINUED | OUTPATIENT
Start: 2023-12-21 | End: 2023-12-22 | Stop reason: SINTOL

## 2023-12-21 RX ORDER — POLYETHYLENE GLYCOL 3350 17 G/17G
17 POWDER, FOR SOLUTION ORAL 2 TIMES DAILY PRN
Status: DISCONTINUED | OUTPATIENT
Start: 2023-12-21 | End: 2023-12-27 | Stop reason: HOSPADM

## 2023-12-21 RX ADMIN — APIXABAN 5 MG: 5 TABLET, FILM COATED ORAL at 06:35

## 2023-12-21 RX ADMIN — SODIUM CHLORIDE 500 ML: 9 INJECTION, SOLUTION INTRAVENOUS at 13:34

## 2023-12-21 RX ADMIN — SODIUM CHLORIDE, PRESERVATIVE FREE 2 ML: 5 INJECTION INTRAVENOUS at 09:44

## 2023-12-21 RX ADMIN — METOPROLOL TARTRATE 25 MG: 25 TABLET, FILM COATED ORAL at 21:16

## 2023-12-21 RX ADMIN — POLYETHYLENE GLYCOL (3350) 17 G: 17 POWDER, FOR SOLUTION ORAL at 22:09

## 2023-12-21 RX ADMIN — SODIUM CHLORIDE: 9 INJECTION, SOLUTION INTRAVENOUS at 23:44

## 2023-12-21 RX ADMIN — ATORVASTATIN CALCIUM 40 MG: 40 TABLET, FILM COATED ORAL at 21:16

## 2023-12-21 RX ADMIN — PROPOFOL 50 MG: 10 INJECTION, EMULSION INTRAVENOUS at 07:49

## 2023-12-21 RX ADMIN — SODIUM CHLORIDE, POTASSIUM CHLORIDE, SODIUM LACTATE AND CALCIUM CHLORIDE: 600; 310; 30; 20 INJECTION, SOLUTION INTRAVENOUS at 07:47

## 2023-12-21 RX ADMIN — INSULIN LISPRO 1 UNITS: 100 INJECTION, SOLUTION INTRAVENOUS; SUBCUTANEOUS at 17:33

## 2023-12-21 RX ADMIN — AMIODARONE HYDROCHLORIDE 1 MG/MIN: 1.8 INJECTION, SOLUTION INTRAVENOUS at 04:48

## 2023-12-21 RX ADMIN — POLYETHYLENE GLYCOL (3350) 17 G: 17 POWDER, FOR SOLUTION ORAL at 09:42

## 2023-12-21 RX ADMIN — APIXABAN 5 MG: 5 TABLET, FILM COATED ORAL at 21:16

## 2023-12-21 RX ADMIN — SODIUM CHLORIDE 500 ML: 9 INJECTION, SOLUTION INTRAVENOUS at 21:34

## 2023-12-21 RX ADMIN — METOPROLOL TARTRATE 25 MG: 25 TABLET, FILM COATED ORAL at 11:00

## 2023-12-21 RX ADMIN — Medication 3 MG: at 21:20

## 2023-12-21 RX ADMIN — MAGNESIUM SULFATE HEPTAHYDRATE 2 G: 40 INJECTION, SOLUTION INTRAVENOUS at 11:05

## 2023-12-21 RX ADMIN — AMIODARONE HYDROCHLORIDE 400 MG: 200 TABLET ORAL at 21:16

## 2023-12-21 RX ADMIN — ONDANSETRON 4 MG: 2 INJECTION INTRAMUSCULAR; INTRAVENOUS at 14:03

## 2023-12-21 RX ADMIN — AMIODARONE HYDROCHLORIDE 400 MG: 200 TABLET ORAL at 09:42

## 2023-12-21 ASSESSMENT — PAIN SCALES - WONG BAKER
WONGBAKER_NUMERICALRESPONSE: 0

## 2023-12-21 ASSESSMENT — PAIN SCALES - GENERAL
PAINLEVEL_OUTOF10: 0

## 2023-12-22 ENCOUNTER — APPOINTMENT (OUTPATIENT)
Dept: GENERAL RADIOLOGY | Age: 78
DRG: 309 | End: 2023-12-22
Attending: INTERNAL MEDICINE

## 2023-12-22 ENCOUNTER — APPOINTMENT (OUTPATIENT)
Dept: ULTRASOUND IMAGING | Age: 78
DRG: 309 | End: 2023-12-22
Attending: INTERNAL MEDICINE

## 2023-12-22 LAB
ALBUMIN SERPL-MCNC: 3.2 G/DL (ref 3.6–5.1)
ALP SERPL-CCNC: 122 UNITS/L (ref 45–117)
ALT SERPL-CCNC: 631 UNITS/L
ANION GAP SERPL CALC-SCNC: 14 MMOL/L (ref 7–19)
AST SERPL-CCNC: 905 UNITS/L
BASOPHILS # BLD: 0.1 K/MCL (ref 0–0.3)
BASOPHILS NFR BLD: 1 %
BILIRUB CONJ SERPL-MCNC: 0.5 MG/DL (ref 0–0.2)
BILIRUB SERPL-MCNC: 1.3 MG/DL (ref 0.2–1)
BUN SERPL-MCNC: 43 MG/DL (ref 6–20)
BUN/CREAT SERPL: 13 (ref 7–25)
CALCIUM SERPL-MCNC: 8.4 MG/DL (ref 8.4–10.2)
CHLORIDE SERPL-SCNC: 103 MMOL/L (ref 97–110)
CO2 SERPL-SCNC: 24 MMOL/L (ref 21–32)
CREAT SERPL-MCNC: 3.21 MG/DL (ref 0.51–0.95)
DEPRECATED RDW RBC: 45.7 FL (ref 39–50)
EGFRCR SERPLBLD CKD-EPI 2021: 14 ML/MIN/{1.73_M2}
EOSINOPHIL # BLD: 0.1 K/MCL (ref 0–0.5)
EOSINOPHIL NFR BLD: 1 %
ERYTHROCYTE [DISTWIDTH] IN BLOOD: 13.8 % (ref 11–15)
FASTING DURATION TIME PATIENT: ABNORMAL H
GLUCOSE BLDC GLUCOMTR-MCNC: 120 MG/DL (ref 70–99)
GLUCOSE BLDC GLUCOMTR-MCNC: 127 MG/DL (ref 70–99)
GLUCOSE BLDC GLUCOMTR-MCNC: 134 MG/DL (ref 70–99)
GLUCOSE BLDC GLUCOMTR-MCNC: 157 MG/DL (ref 70–99)
GLUCOSE SERPL-MCNC: 141 MG/DL (ref 70–99)
HCT VFR BLD CALC: 41.4 % (ref 36–46.5)
HGB BLD-MCNC: 13.2 G/DL (ref 12–15.5)
IMM GRANULOCYTES # BLD AUTO: 0.1 K/MCL (ref 0–0.2)
IMM GRANULOCYTES # BLD: 1 %
LYMPHOCYTES # BLD: 2 K/MCL (ref 1–4)
LYMPHOCYTES NFR BLD: 20 %
MAGNESIUM SERPL-MCNC: 2.1 MG/DL (ref 1.7–2.4)
MCH RBC QN AUTO: 28.8 PG (ref 26–34)
MCHC RBC AUTO-ENTMCNC: 31.9 G/DL (ref 32–36.5)
MCV RBC AUTO: 90.4 FL (ref 78–100)
MONOCYTES # BLD: 1.4 K/MCL (ref 0.3–0.9)
MONOCYTES NFR BLD: 14 %
NEUTROPHILS # BLD: 6.3 K/MCL (ref 1.8–7.7)
NEUTROPHILS NFR BLD: 63 %
NRBC BLD MANUAL-RTO: 0 /100 WBC
PLATELET # BLD AUTO: 263 K/MCL (ref 140–450)
POTASSIUM SERPL-SCNC: 4 MMOL/L (ref 3.4–5.1)
PROT SERPL-MCNC: 6 G/DL (ref 6.4–8.2)
RBC # BLD: 4.58 MIL/MCL (ref 4–5.2)
SODIUM SERPL-SCNC: 137 MMOL/L (ref 135–145)
UUN UR-MCNC: 172 MG/DL
WBC # BLD: 9.9 K/MCL (ref 4.2–11)

## 2023-12-22 PROCEDURE — 10002803 HB RX 637: Performed by: PHYSICIAN ASSISTANT

## 2023-12-22 PROCEDURE — 76705 ECHO EXAM OF ABDOMEN: CPT

## 2023-12-22 PROCEDURE — 80048 BASIC METABOLIC PNL TOTAL CA: CPT | Performed by: INTERNAL MEDICINE

## 2023-12-22 PROCEDURE — 80076 HEPATIC FUNCTION PANEL: CPT | Performed by: INTERNAL MEDICINE

## 2023-12-22 PROCEDURE — 10004651 HB RX, NO CHARGE ITEM: Performed by: INTERNAL MEDICINE

## 2023-12-22 PROCEDURE — 10002803 HB RX 637: Performed by: INTERNAL MEDICINE

## 2023-12-22 PROCEDURE — 36415 COLL VENOUS BLD VENIPUNCTURE: CPT | Performed by: INTERNAL MEDICINE

## 2023-12-22 PROCEDURE — 71045 X-RAY EXAM CHEST 1 VIEW: CPT

## 2023-12-22 PROCEDURE — 10002807 HB RX 258: Performed by: INTERNAL MEDICINE

## 2023-12-22 PROCEDURE — 13003291 HB INS MIDLINE W/GUIDE INCL CATH

## 2023-12-22 PROCEDURE — 10006031 HB ROOM CHARGE TELEMETRY

## 2023-12-22 PROCEDURE — 10002803 HB RX 637: Performed by: HOSPITALIST

## 2023-12-22 PROCEDURE — 83735 ASSAY OF MAGNESIUM: CPT | Performed by: INTERNAL MEDICINE

## 2023-12-22 PROCEDURE — 10004281 HB COUNTER-STAFF TIME PER 15 MIN

## 2023-12-22 PROCEDURE — 85025 COMPLETE CBC W/AUTO DIFF WBC: CPT | Performed by: INTERNAL MEDICINE

## 2023-12-22 PROCEDURE — 76775 US EXAM ABDO BACK WALL LIM: CPT

## 2023-12-22 RX ORDER — 0.9 % SODIUM CHLORIDE 0.9 %
10 VIAL (ML) INJECTION PRN
Status: DISCONTINUED | OUTPATIENT
Start: 2023-12-22 | End: 2023-12-27 | Stop reason: HOSPADM

## 2023-12-22 RX ORDER — 0.9 % SODIUM CHLORIDE 0.9 %
10 VIAL (ML) INJECTION EVERY 12 HOURS SCHEDULED
Status: DISCONTINUED | OUTPATIENT
Start: 2023-12-22 | End: 2023-12-27 | Stop reason: HOSPADM

## 2023-12-22 RX ADMIN — SODIUM CHLORIDE, PRESERVATIVE FREE 10 ML: 5 INJECTION INTRAVENOUS at 20:16

## 2023-12-22 RX ADMIN — APIXABAN 5 MG: 5 TABLET, FILM COATED ORAL at 07:48

## 2023-12-22 RX ADMIN — POLYETHYLENE GLYCOL (3350) 17 G: 17 POWDER, FOR SOLUTION ORAL at 07:48

## 2023-12-22 RX ADMIN — SODIUM CHLORIDE, PRESERVATIVE FREE 2 ML: 5 INJECTION INTRAVENOUS at 20:17

## 2023-12-22 RX ADMIN — AMIODARONE HYDROCHLORIDE 400 MG: 200 TABLET ORAL at 07:48

## 2023-12-22 RX ADMIN — SODIUM CHLORIDE: 9 INJECTION, SOLUTION INTRAVENOUS at 02:57

## 2023-12-22 RX ADMIN — APIXABAN 2.5 MG: 2.5 TABLET, FILM COATED ORAL at 20:16

## 2023-12-22 ASSESSMENT — PAIN SCALES - WONG BAKER
WONGBAKER_NUMERICALRESPONSE: 0

## 2023-12-22 ASSESSMENT — PAIN SCALES - GENERAL
PAINLEVEL_OUTOF10: 0
PAINLEVEL_OUTOF10: 0

## 2023-12-23 LAB
ALBUMIN SERPL-MCNC: 2.2 G/DL (ref 3.6–5.1)
ALBUMIN/GLOB SERPL: 1.2 {RATIO} (ref 1–2.4)
ALP SERPL-CCNC: 93 UNITS/L (ref 45–117)
ALT SERPL-CCNC: 340 UNITS/L
ANION GAP SERPL CALC-SCNC: 11 MMOL/L (ref 7–19)
ANION GAP SERPL CALC-SCNC: 11 MMOL/L (ref 7–19)
AST SERPL-CCNC: 316 UNITS/L
BILIRUB CONJ SERPL-MCNC: 0.3 MG/DL (ref 0–0.2)
BILIRUB SERPL-MCNC: 0.8 MG/DL (ref 0.2–1)
BUN SERPL-MCNC: 39 MG/DL (ref 6–20)
BUN SERPL-MCNC: 48 MG/DL (ref 6–20)
BUN/CREAT SERPL: 15 (ref 7–25)
BUN/CREAT SERPL: 16 (ref 7–25)
CALCIUM SERPL-MCNC: 6.2 MG/DL (ref 8.4–10.2)
CALCIUM SERPL-MCNC: 8.3 MG/DL (ref 8.4–10.2)
CHLORIDE SERPL-SCNC: 104 MMOL/L (ref 97–110)
CHLORIDE SERPL-SCNC: 116 MMOL/L (ref 97–110)
CO2 SERPL-SCNC: 20 MMOL/L (ref 21–32)
CO2 SERPL-SCNC: 26 MMOL/L (ref 21–32)
CREAT SERPL-MCNC: 2.48 MG/DL (ref 0.51–0.95)
CREAT SERPL-MCNC: 3.12 MG/DL (ref 0.51–0.95)
CREAT UR-MCNC: 186 MG/DL
DEPRECATED RDW RBC: 45.1 FL (ref 39–50)
EGFRCR SERPLBLD CKD-EPI 2021: 15 ML/MIN/{1.73_M2}
EGFRCR SERPLBLD CKD-EPI 2021: 19 ML/MIN/{1.73_M2}
ERYTHROCYTE [DISTWIDTH] IN BLOOD: 13.9 % (ref 11–15)
FASTING DURATION TIME PATIENT: ABNORMAL H
FASTING DURATION TIME PATIENT: ABNORMAL H
GLOBULIN SER-MCNC: 1.9 G/DL (ref 2–4)
GLUCOSE BLDC GLUCOMTR-MCNC: 126 MG/DL (ref 70–99)
GLUCOSE BLDC GLUCOMTR-MCNC: 169 MG/DL (ref 70–99)
GLUCOSE BLDC GLUCOMTR-MCNC: 170 MG/DL (ref 70–99)
GLUCOSE BLDC GLUCOMTR-MCNC: 202 MG/DL (ref 70–99)
GLUCOSE SERPL-MCNC: 109 MG/DL (ref 70–99)
GLUCOSE SERPL-MCNC: 185 MG/DL (ref 70–99)
HCT VFR BLD CALC: 39.9 % (ref 36–46.5)
HGB BLD-MCNC: 12.8 G/DL (ref 12–15.5)
MAGNESIUM SERPL-MCNC: 1.7 MG/DL (ref 1.7–2.4)
MAGNESIUM SERPL-MCNC: 2.3 MG/DL (ref 1.7–2.4)
MCH RBC QN AUTO: 28.7 PG (ref 26–34)
MCHC RBC AUTO-ENTMCNC: 32.1 G/DL (ref 32–36.5)
MCV RBC AUTO: 89.5 FL (ref 78–100)
NRBC BLD MANUAL-RTO: 0 /100 WBC
PHOSPHATE SERPL-MCNC: 4.2 MG/DL (ref 2.4–4.7)
PLATELET # BLD AUTO: 222 K/MCL (ref 140–450)
POTASSIUM SERPL-SCNC: 2.9 MMOL/L (ref 3.4–5.1)
POTASSIUM SERPL-SCNC: 4.2 MMOL/L (ref 3.4–5.1)
PROT SERPL-MCNC: 4.1 G/DL (ref 6.4–8.2)
PROT UR-MCNC: 56 MG/DL
RBC # BLD: 4.46 MIL/MCL (ref 4–5.2)
SODIUM SERPL-SCNC: 137 MMOL/L (ref 135–145)
SODIUM SERPL-SCNC: 144 MMOL/L (ref 135–145)
WBC # BLD: 8.6 K/MCL (ref 4.2–11)

## 2023-12-23 PROCEDURE — 10006031 HB ROOM CHARGE TELEMETRY

## 2023-12-23 PROCEDURE — 10002803 HB RX 637: Performed by: INTERNAL MEDICINE

## 2023-12-23 PROCEDURE — 82248 BILIRUBIN DIRECT: CPT | Performed by: PHYSICIAN ASSISTANT

## 2023-12-23 PROCEDURE — 83735 ASSAY OF MAGNESIUM: CPT | Performed by: INTERNAL MEDICINE

## 2023-12-23 PROCEDURE — 82570 ASSAY OF URINE CREATININE: CPT | Performed by: INTERNAL MEDICINE

## 2023-12-23 PROCEDURE — 10004651 HB RX, NO CHARGE ITEM: Performed by: INTERNAL MEDICINE

## 2023-12-23 PROCEDURE — 80048 BASIC METABOLIC PNL TOTAL CA: CPT | Performed by: INTERNAL MEDICINE

## 2023-12-23 PROCEDURE — 10002800 HB RX 250 W HCPCS: Performed by: INTERNAL MEDICINE

## 2023-12-23 PROCEDURE — 80053 COMPREHEN METABOLIC PANEL: CPT | Performed by: INTERNAL MEDICINE

## 2023-12-23 PROCEDURE — 85027 COMPLETE CBC AUTOMATED: CPT | Performed by: INTERNAL MEDICINE

## 2023-12-23 PROCEDURE — 84100 ASSAY OF PHOSPHORUS: CPT | Performed by: INTERNAL MEDICINE

## 2023-12-23 PROCEDURE — 84156 ASSAY OF PROTEIN URINE: CPT | Performed by: INTERNAL MEDICINE

## 2023-12-23 RX ORDER — AMIODARONE HYDROCHLORIDE 200 MG/1
200 TABLET ORAL EVERY 12 HOURS SCHEDULED
Status: DISCONTINUED | OUTPATIENT
Start: 2023-12-23 | End: 2023-12-27 | Stop reason: HOSPADM

## 2023-12-23 RX ORDER — POTASSIUM CHLORIDE 14.9 MG/ML
20 INJECTION INTRAVENOUS ONCE
Status: COMPLETED | OUTPATIENT
Start: 2023-12-23 | End: 2023-12-27

## 2023-12-23 RX ORDER — LANOLIN ALCOHOL/MO/W.PET/CERES
400 CREAM (GRAM) TOPICAL ONCE
Status: COMPLETED | OUTPATIENT
Start: 2023-12-23 | End: 2023-12-23

## 2023-12-23 RX ADMIN — ACETAMINOPHEN 650 MG: 325 TABLET ORAL at 15:09

## 2023-12-23 RX ADMIN — POTASSIUM CHLORIDE 20 MEQ: 14.9 INJECTION, SOLUTION INTRAVENOUS at 13:06

## 2023-12-23 RX ADMIN — APIXABAN 2.5 MG: 2.5 TABLET, FILM COATED ORAL at 20:08

## 2023-12-23 RX ADMIN — Medication 400 MG: at 10:56

## 2023-12-23 RX ADMIN — SODIUM CHLORIDE, PRESERVATIVE FREE 2 ML: 5 INJECTION INTRAVENOUS at 20:09

## 2023-12-23 RX ADMIN — AMIODARONE HYDROCHLORIDE 200 MG: 200 TABLET ORAL at 20:08

## 2023-12-23 RX ADMIN — SODIUM CHLORIDE, PRESERVATIVE FREE 10 ML: 5 INJECTION INTRAVENOUS at 20:09

## 2023-12-23 RX ADMIN — POTASSIUM CHLORIDE 20 MEQ: 14.9 INJECTION, SOLUTION INTRAVENOUS at 10:55

## 2023-12-23 RX ADMIN — APIXABAN 2.5 MG: 2.5 TABLET, FILM COATED ORAL at 08:59

## 2023-12-23 ASSESSMENT — PAIN SCALES - GENERAL
PAINLEVEL_OUTOF10: 0
PAINLEVEL_OUTOF10: 4
PAINLEVEL_OUTOF10: 0

## 2023-12-23 ASSESSMENT — PAIN SCALES - WONG BAKER
WONGBAKER_NUMERICALRESPONSE: 0

## 2023-12-24 ENCOUNTER — APPOINTMENT (OUTPATIENT)
Dept: GENERAL RADIOLOGY | Age: 78
DRG: 309 | End: 2023-12-24
Attending: HOSPITALIST

## 2023-12-24 LAB
ALBUMIN SERPL-MCNC: 2.8 G/DL (ref 3.6–5.1)
ALBUMIN/GLOB SERPL: 0.9 {RATIO} (ref 1–2.4)
ALP SERPL-CCNC: 131 UNITS/L (ref 45–117)
ALT SERPL-CCNC: 367 UNITS/L
ANION GAP SERPL CALC-SCNC: 11 MMOL/L (ref 7–19)
AST SERPL-CCNC: 255 UNITS/L
BASOPHILS # BLD: 0 K/MCL (ref 0–0.3)
BASOPHILS NFR BLD: 1 %
BILIRUB CONJ SERPL-MCNC: 0.4 MG/DL (ref 0–0.2)
BILIRUB SERPL-MCNC: 1.1 MG/DL (ref 0.2–1)
BUN SERPL-MCNC: 46 MG/DL (ref 6–20)
BUN/CREAT SERPL: 17 (ref 7–25)
CALCIUM SERPL-MCNC: 7.9 MG/DL (ref 8.4–10.2)
CHLORIDE SERPL-SCNC: 106 MMOL/L (ref 97–110)
CO2 SERPL-SCNC: 24 MMOL/L (ref 21–32)
CREAT SERPL-MCNC: 2.64 MG/DL (ref 0.51–0.95)
DEPRECATED RDW RBC: 44.8 FL (ref 39–50)
EGFRCR SERPLBLD CKD-EPI 2021: 18 ML/MIN/{1.73_M2}
EOSINOPHIL # BLD: 0.3 K/MCL (ref 0–0.5)
EOSINOPHIL NFR BLD: 4 %
ERYTHROCYTE [DISTWIDTH] IN BLOOD: 13.7 % (ref 11–15)
FASTING DURATION TIME PATIENT: ABNORMAL H
GLOBULIN SER-MCNC: 3 G/DL (ref 2–4)
GLUCOSE BLDC GLUCOMTR-MCNC: 149 MG/DL (ref 70–99)
GLUCOSE BLDC GLUCOMTR-MCNC: 157 MG/DL (ref 70–99)
GLUCOSE BLDC GLUCOMTR-MCNC: 175 MG/DL (ref 70–99)
GLUCOSE BLDC GLUCOMTR-MCNC: 221 MG/DL (ref 70–99)
GLUCOSE SERPL-MCNC: 159 MG/DL (ref 70–99)
HCT VFR BLD CALC: 40.5 % (ref 36–46.5)
HGB BLD-MCNC: 13 G/DL (ref 12–15.5)
IMM GRANULOCYTES # BLD AUTO: 0 K/MCL (ref 0–0.2)
IMM GRANULOCYTES # BLD: 0 %
LYMPHOCYTES # BLD: 1.2 K/MCL (ref 1–4)
LYMPHOCYTES NFR BLD: 17 %
MAGNESIUM SERPL-MCNC: 2.3 MG/DL (ref 1.7–2.4)
MCH RBC QN AUTO: 28.6 PG (ref 26–34)
MCHC RBC AUTO-ENTMCNC: 32.1 G/DL (ref 32–36.5)
MCV RBC AUTO: 89.2 FL (ref 78–100)
MONOCYTES # BLD: 1.2 K/MCL (ref 0.3–0.9)
MONOCYTES NFR BLD: 17 %
NEUTROPHILS # BLD: 4.3 K/MCL (ref 1.8–7.7)
NEUTROPHILS NFR BLD: 61 %
NRBC BLD MANUAL-RTO: 0 /100 WBC
PHOSPHATE SERPL-MCNC: 3.9 MG/DL (ref 2.4–4.7)
PLATELET # BLD AUTO: 215 K/MCL (ref 140–450)
POTASSIUM SERPL-SCNC: 3.6 MMOL/L (ref 3.4–5.1)
PROT SERPL-MCNC: 5.8 G/DL (ref 6.4–8.2)
RBC # BLD: 4.54 MIL/MCL (ref 4–5.2)
SODIUM SERPL-SCNC: 137 MMOL/L (ref 135–145)
WBC # BLD: 7.1 K/MCL (ref 4.2–11)

## 2023-12-24 PROCEDURE — 10002019 HB COUNTER RESP ASSESSMENT

## 2023-12-24 PROCEDURE — 96372 THER/PROPH/DIAG INJ SC/IM: CPT | Performed by: INTERNAL MEDICINE

## 2023-12-24 PROCEDURE — 10004651 HB RX, NO CHARGE ITEM: Performed by: INTERNAL MEDICINE

## 2023-12-24 PROCEDURE — 10002801 HB RX 250 W/O HCPCS: Performed by: HOSPITALIST

## 2023-12-24 PROCEDURE — 10002800 HB RX 250 W HCPCS: Performed by: INTERNAL MEDICINE

## 2023-12-24 PROCEDURE — 10002803 HB RX 637: Performed by: INTERNAL MEDICINE

## 2023-12-24 PROCEDURE — 10002016 HB COUNTER INCENTIVE SPIROMETRY

## 2023-12-24 PROCEDURE — 84100 ASSAY OF PHOSPHORUS: CPT | Performed by: INTERNAL MEDICINE

## 2023-12-24 PROCEDURE — 82248 BILIRUBIN DIRECT: CPT | Performed by: HOSPITALIST

## 2023-12-24 PROCEDURE — 13001086 HB INCENTIVE SPIROMETER W INSTRUCT

## 2023-12-24 PROCEDURE — 83735 ASSAY OF MAGNESIUM: CPT | Performed by: INTERNAL MEDICINE

## 2023-12-24 PROCEDURE — 10004180 HB COUNTER-TRANSPORT

## 2023-12-24 PROCEDURE — 71046 X-RAY EXAM CHEST 2 VIEWS: CPT

## 2023-12-24 PROCEDURE — 10002803 HB RX 637: Performed by: HOSPITALIST

## 2023-12-24 PROCEDURE — 85025 COMPLETE CBC W/AUTO DIFF WBC: CPT | Performed by: HOSPITALIST

## 2023-12-24 PROCEDURE — 94640 AIRWAY INHALATION TREATMENT: CPT

## 2023-12-24 PROCEDURE — 10006031 HB ROOM CHARGE TELEMETRY

## 2023-12-24 PROCEDURE — 80053 COMPREHEN METABOLIC PANEL: CPT | Performed by: INTERNAL MEDICINE

## 2023-12-24 RX ORDER — ALBUTEROL SULFATE 2.5 MG/3ML
2.5 SOLUTION RESPIRATORY (INHALATION)
Status: DISCONTINUED | OUTPATIENT
Start: 2023-12-24 | End: 2023-12-27 | Stop reason: HOSPADM

## 2023-12-24 RX ORDER — FUROSEMIDE 10 MG/ML
20 INJECTION INTRAMUSCULAR; INTRAVENOUS ONCE
Status: COMPLETED | OUTPATIENT
Start: 2023-12-24 | End: 2023-12-24

## 2023-12-24 RX ORDER — IPRATROPIUM BROMIDE AND ALBUTEROL SULFATE 2.5; .5 MG/3ML; MG/3ML
3 SOLUTION RESPIRATORY (INHALATION)
Status: DISCONTINUED | OUTPATIENT
Start: 2023-12-24 | End: 2023-12-26 | Stop reason: CLARIF

## 2023-12-24 RX ORDER — NYSTATIN 100000 [USP'U]/G
POWDER TOPICAL EVERY 12 HOURS SCHEDULED
Status: DISCONTINUED | OUTPATIENT
Start: 2023-12-24 | End: 2023-12-27 | Stop reason: HOSPADM

## 2023-12-24 RX ADMIN — ACETAMINOPHEN 650 MG: 325 TABLET ORAL at 19:37

## 2023-12-24 RX ADMIN — IPRATROPIUM BROMIDE AND ALBUTEROL SULFATE 3 ML: 2.5; .5 SOLUTION RESPIRATORY (INHALATION) at 13:05

## 2023-12-24 RX ADMIN — APIXABAN 2.5 MG: 2.5 TABLET, FILM COATED ORAL at 21:23

## 2023-12-24 RX ADMIN — SODIUM CHLORIDE, PRESERVATIVE FREE 2 ML: 5 INJECTION INTRAVENOUS at 21:31

## 2023-12-24 RX ADMIN — FUROSEMIDE 20 MG: 10 INJECTION, SOLUTION INTRAMUSCULAR; INTRAVENOUS at 17:53

## 2023-12-24 RX ADMIN — INSULIN LISPRO 1 UNITS: 100 INJECTION, SOLUTION INTRAVENOUS; SUBCUTANEOUS at 12:56

## 2023-12-24 RX ADMIN — AMIODARONE HYDROCHLORIDE 200 MG: 200 TABLET ORAL at 09:23

## 2023-12-24 RX ADMIN — NYSTATIN: 100000 POWDER TOPICAL at 19:01

## 2023-12-24 RX ADMIN — IPRATROPIUM BROMIDE AND ALBUTEROL SULFATE 3 ML: 2.5; .5 SOLUTION RESPIRATORY (INHALATION) at 20:47

## 2023-12-24 RX ADMIN — SODIUM CHLORIDE, PRESERVATIVE FREE 10 ML: 5 INJECTION INTRAVENOUS at 09:23

## 2023-12-24 RX ADMIN — INSULIN LISPRO 1 UNITS: 100 INJECTION, SOLUTION INTRAVENOUS; SUBCUTANEOUS at 17:53

## 2023-12-24 RX ADMIN — IPRATROPIUM BROMIDE AND ALBUTEROL SULFATE 3 ML: 2.5; .5 SOLUTION RESPIRATORY (INHALATION) at 08:50

## 2023-12-24 RX ADMIN — SODIUM CHLORIDE, PRESERVATIVE FREE 10 ML: 5 INJECTION INTRAVENOUS at 21:31

## 2023-12-24 RX ADMIN — APIXABAN 2.5 MG: 2.5 TABLET, FILM COATED ORAL at 09:23

## 2023-12-24 RX ADMIN — AMIODARONE HYDROCHLORIDE 200 MG: 200 TABLET ORAL at 21:23

## 2023-12-24 ASSESSMENT — PULMONARY FUNCTION TESTS
FEV1: 0
FEV1: 0
FEV1/FVC: UNABLE TO OBTAIN, OR GREATER THAN 70%
FEV1: 0
FEV1_PREDICTED: 0

## 2023-12-24 ASSESSMENT — PAIN SCALES - WONG BAKER
WONGBAKER_NUMERICALRESPONSE: 0
WONGBAKER_NUMERICALRESPONSE: 4
WONGBAKER_NUMERICALRESPONSE: 0
WONGBAKER_NUMERICALRESPONSE: 4
WONGBAKER_NUMERICALRESPONSE: 0

## 2023-12-24 ASSESSMENT — PAIN SCALES - GENERAL
PAINLEVEL_OUTOF10: 4
PAINLEVEL_OUTOF10: 0
PAINLEVEL_OUTOF10: 4

## 2023-12-25 LAB
ALBUMIN SERPL-MCNC: 2.7 G/DL (ref 3.6–5.1)
ALP SERPL-CCNC: 112 UNITS/L (ref 45–117)
ALT SERPL-CCNC: 258 UNITS/L
ANION GAP SERPL CALC-SCNC: 9 MMOL/L (ref 7–19)
AST SERPL-CCNC: 139 UNITS/L
BILIRUB CONJ SERPL-MCNC: 0.3 MG/DL (ref 0–0.2)
BILIRUB SERPL-MCNC: 1.1 MG/DL (ref 0.2–1)
BUN SERPL-MCNC: 37 MG/DL (ref 6–20)
BUN/CREAT SERPL: 20 (ref 7–25)
CALCIUM SERPL-MCNC: 8.1 MG/DL (ref 8.4–10.2)
CHLORIDE SERPL-SCNC: 107 MMOL/L (ref 97–110)
CO2 SERPL-SCNC: 28 MMOL/L (ref 21–32)
CREAT SERPL-MCNC: 1.84 MG/DL (ref 0.51–0.95)
EGFRCR SERPLBLD CKD-EPI 2021: 27 ML/MIN/{1.73_M2}
FASTING DURATION TIME PATIENT: ABNORMAL H
GLUCOSE BLDC GLUCOMTR-MCNC: 149 MG/DL (ref 70–99)
GLUCOSE BLDC GLUCOMTR-MCNC: 151 MG/DL (ref 70–99)
GLUCOSE BLDC GLUCOMTR-MCNC: 178 MG/DL (ref 70–99)
GLUCOSE BLDC GLUCOMTR-MCNC: 187 MG/DL (ref 70–99)
GLUCOSE SERPL-MCNC: 179 MG/DL (ref 70–99)
MAGNESIUM SERPL-MCNC: 2.2 MG/DL (ref 1.7–2.4)
PHOSPHATE SERPL-MCNC: 3.4 MG/DL (ref 2.4–4.7)
POTASSIUM SERPL-SCNC: 3.6 MMOL/L (ref 3.4–5.1)
PROT SERPL-MCNC: 5.6 G/DL (ref 6.4–8.2)
SODIUM SERPL-SCNC: 140 MMOL/L (ref 135–145)

## 2023-12-25 PROCEDURE — 84155 ASSAY OF PROTEIN SERUM: CPT | Performed by: HOSPITALIST

## 2023-12-25 PROCEDURE — 94667 MNPJ CHEST WALL 1ST: CPT

## 2023-12-25 PROCEDURE — 10004651 HB RX, NO CHARGE ITEM: Performed by: INTERNAL MEDICINE

## 2023-12-25 PROCEDURE — 10002803 HB RX 637: Performed by: HOSPITALIST

## 2023-12-25 PROCEDURE — 84075 ASSAY ALKALINE PHOSPHATASE: CPT | Performed by: HOSPITALIST

## 2023-12-25 PROCEDURE — 82248 BILIRUBIN DIRECT: CPT | Performed by: HOSPITALIST

## 2023-12-25 PROCEDURE — 10002803 HB RX 637: Performed by: INTERNAL MEDICINE

## 2023-12-25 PROCEDURE — 84460 ALANINE AMINO (ALT) (SGPT): CPT | Performed by: HOSPITALIST

## 2023-12-25 PROCEDURE — 10002800 HB RX 250 W HCPCS: Performed by: INTERNAL MEDICINE

## 2023-12-25 PROCEDURE — 10002801 HB RX 250 W/O HCPCS: Performed by: HOSPITALIST

## 2023-12-25 PROCEDURE — 80069 RENAL FUNCTION PANEL: CPT | Performed by: INTERNAL MEDICINE

## 2023-12-25 PROCEDURE — 10004180 HB COUNTER-TRANSPORT

## 2023-12-25 PROCEDURE — 83735 ASSAY OF MAGNESIUM: CPT | Performed by: INTERNAL MEDICINE

## 2023-12-25 PROCEDURE — 96372 THER/PROPH/DIAG INJ SC/IM: CPT | Performed by: INTERNAL MEDICINE

## 2023-12-25 PROCEDURE — 94640 AIRWAY INHALATION TREATMENT: CPT

## 2023-12-25 PROCEDURE — 84450 TRANSFERASE (AST) (SGOT): CPT | Performed by: HOSPITALIST

## 2023-12-25 PROCEDURE — 10006031 HB ROOM CHARGE TELEMETRY

## 2023-12-25 PROCEDURE — 82247 BILIRUBIN TOTAL: CPT | Performed by: HOSPITALIST

## 2023-12-25 PROCEDURE — 94664 DEMO&/EVAL PT USE INHALER: CPT

## 2023-12-25 RX ORDER — GUAIFENESIN/DEXTROMETHORPHAN 100-10MG/5
10 SYRUP ORAL EVERY 4 HOURS PRN
Status: DISCONTINUED | OUTPATIENT
Start: 2023-12-25 | End: 2023-12-27 | Stop reason: HOSPADM

## 2023-12-25 RX ORDER — FUROSEMIDE 10 MG/ML
20 INJECTION INTRAMUSCULAR; INTRAVENOUS
Status: DISCONTINUED | OUTPATIENT
Start: 2023-12-25 | End: 2023-12-26

## 2023-12-25 RX ORDER — PANTOPRAZOLE SODIUM 40 MG/1
40 TABLET, DELAYED RELEASE ORAL
Status: DISCONTINUED | OUTPATIENT
Start: 2023-12-25 | End: 2023-12-27 | Stop reason: HOSPADM

## 2023-12-25 RX ADMIN — APIXABAN 2.5 MG: 2.5 TABLET, FILM COATED ORAL at 20:48

## 2023-12-25 RX ADMIN — GUAIFENESIN AND DEXTROMETHORPHAN 10 ML: 100; 10 SYRUP ORAL at 12:05

## 2023-12-25 RX ADMIN — SODIUM CHLORIDE, PRESERVATIVE FREE 2 ML: 5 INJECTION INTRAVENOUS at 08:06

## 2023-12-25 RX ADMIN — NYSTATIN: 100000 POWDER TOPICAL at 20:49

## 2023-12-25 RX ADMIN — IPRATROPIUM BROMIDE AND ALBUTEROL SULFATE 3 ML: 2.5; .5 SOLUTION RESPIRATORY (INHALATION) at 08:05

## 2023-12-25 RX ADMIN — AMIODARONE HYDROCHLORIDE 200 MG: 200 TABLET ORAL at 08:05

## 2023-12-25 RX ADMIN — AMIODARONE HYDROCHLORIDE 200 MG: 200 TABLET ORAL at 20:48

## 2023-12-25 RX ADMIN — SODIUM CHLORIDE, PRESERVATIVE FREE 10 ML: 5 INJECTION INTRAVENOUS at 20:49

## 2023-12-25 RX ADMIN — IPRATROPIUM BROMIDE AND ALBUTEROL SULFATE 3 ML: 2.5; .5 SOLUTION RESPIRATORY (INHALATION) at 21:51

## 2023-12-25 RX ADMIN — IPRATROPIUM BROMIDE AND ALBUTEROL SULFATE 3 ML: 2.5; .5 SOLUTION RESPIRATORY (INHALATION) at 01:58

## 2023-12-25 RX ADMIN — FUROSEMIDE 20 MG: 10 INJECTION, SOLUTION INTRAMUSCULAR; INTRAVENOUS at 12:00

## 2023-12-25 RX ADMIN — FUROSEMIDE 20 MG: 10 INJECTION, SOLUTION INTRAMUSCULAR; INTRAVENOUS at 17:41

## 2023-12-25 RX ADMIN — SODIUM CHLORIDE, PRESERVATIVE FREE 10 ML: 5 INJECTION INTRAVENOUS at 08:06

## 2023-12-25 RX ADMIN — SODIUM CHLORIDE, PRESERVATIVE FREE 2 ML: 5 INJECTION INTRAVENOUS at 20:48

## 2023-12-25 RX ADMIN — PANTOPRAZOLE SODIUM 40 MG: 40 TABLET, DELAYED RELEASE ORAL at 12:00

## 2023-12-25 RX ADMIN — INSULIN LISPRO 1 UNITS: 100 INJECTION, SOLUTION INTRAVENOUS; SUBCUTANEOUS at 12:00

## 2023-12-25 RX ADMIN — ACETAMINOPHEN 650 MG: 325 TABLET ORAL at 08:05

## 2023-12-25 RX ADMIN — APIXABAN 2.5 MG: 2.5 TABLET, FILM COATED ORAL at 08:05

## 2023-12-25 RX ADMIN — IPRATROPIUM BROMIDE AND ALBUTEROL SULFATE 3 ML: 2.5; .5 SOLUTION RESPIRATORY (INHALATION) at 13:28

## 2023-12-25 RX ADMIN — NYSTATIN: 100000 POWDER TOPICAL at 08:07

## 2023-12-25 RX ADMIN — INSULIN LISPRO 1 UNITS: 100 INJECTION, SOLUTION INTRAVENOUS; SUBCUTANEOUS at 17:41

## 2023-12-25 ASSESSMENT — PAIN SCALES - GENERAL
PAINLEVEL_OUTOF10: 0
PAINLEVEL_OUTOF10: 1
PAINLEVEL_OUTOF10: 5

## 2023-12-26 LAB
ALBUMIN SERPL-MCNC: 2.7 G/DL (ref 3.6–5.1)
ALP SERPL-CCNC: 98 UNITS/L (ref 45–117)
ALT SERPL-CCNC: 185 UNITS/L
ANION GAP SERPL CALC-SCNC: 9 MMOL/L (ref 7–19)
AST SERPL-CCNC: 78 UNITS/L
BILIRUB CONJ SERPL-MCNC: 0.5 MG/DL (ref 0–0.2)
BILIRUB SERPL-MCNC: 1.5 MG/DL (ref 0.2–1)
BUN SERPL-MCNC: 30 MG/DL (ref 6–20)
BUN/CREAT SERPL: 25 (ref 7–25)
CALCIUM SERPL-MCNC: 9.1 MG/DL (ref 8.4–10.2)
CHLORIDE SERPL-SCNC: 106 MMOL/L (ref 97–110)
CO2 SERPL-SCNC: 30 MMOL/L (ref 21–32)
CREAT SERPL-MCNC: 1.19 MG/DL (ref 0.51–0.95)
EGFRCR SERPLBLD CKD-EPI 2021: 46 ML/MIN/{1.73_M2}
FASTING DURATION TIME PATIENT: ABNORMAL H
GLUCOSE BLDC GLUCOMTR-MCNC: 150 MG/DL (ref 70–99)
GLUCOSE BLDC GLUCOMTR-MCNC: 159 MG/DL (ref 70–99)
GLUCOSE BLDC GLUCOMTR-MCNC: 170 MG/DL (ref 70–99)
GLUCOSE BLDC GLUCOMTR-MCNC: 182 MG/DL (ref 70–99)
GLUCOSE SERPL-MCNC: 171 MG/DL (ref 70–99)
MAGNESIUM SERPL-MCNC: 1.6 MG/DL (ref 1.7–2.4)
PHOSPHATE SERPL-MCNC: 3.2 MG/DL (ref 2.4–4.7)
POTASSIUM SERPL-SCNC: 3.6 MMOL/L (ref 3.4–5.1)
PROT SERPL-MCNC: 5.4 G/DL (ref 6.4–8.2)
SODIUM SERPL-SCNC: 141 MMOL/L (ref 135–145)

## 2023-12-26 PROCEDURE — 10006031 HB ROOM CHARGE TELEMETRY

## 2023-12-26 PROCEDURE — 84155 ASSAY OF PROTEIN SERUM: CPT | Performed by: HOSPITALIST

## 2023-12-26 PROCEDURE — 10004651 HB RX, NO CHARGE ITEM: Performed by: INTERNAL MEDICINE

## 2023-12-26 PROCEDURE — 10002801 HB RX 250 W/O HCPCS: Performed by: HOSPITALIST

## 2023-12-26 PROCEDURE — 10004180 HB COUNTER-TRANSPORT

## 2023-12-26 PROCEDURE — 94640 AIRWAY INHALATION TREATMENT: CPT

## 2023-12-26 PROCEDURE — 10002803 HB RX 637: Performed by: INTERNAL MEDICINE

## 2023-12-26 PROCEDURE — 83735 ASSAY OF MAGNESIUM: CPT | Performed by: INTERNAL MEDICINE

## 2023-12-26 PROCEDURE — 82247 BILIRUBIN TOTAL: CPT | Performed by: HOSPITALIST

## 2023-12-26 PROCEDURE — 10002019 HB COUNTER RESP ASSESSMENT

## 2023-12-26 PROCEDURE — 10002803 HB RX 637: Performed by: PHYSICIAN ASSISTANT

## 2023-12-26 PROCEDURE — 10002803 HB RX 637: Performed by: HOSPITALIST

## 2023-12-26 PROCEDURE — 84460 ALANINE AMINO (ALT) (SGPT): CPT | Performed by: HOSPITALIST

## 2023-12-26 PROCEDURE — 82248 BILIRUBIN DIRECT: CPT | Performed by: HOSPITALIST

## 2023-12-26 PROCEDURE — 96372 THER/PROPH/DIAG INJ SC/IM: CPT | Performed by: INTERNAL MEDICINE

## 2023-12-26 PROCEDURE — 84075 ASSAY ALKALINE PHOSPHATASE: CPT | Performed by: HOSPITALIST

## 2023-12-26 PROCEDURE — 80069 RENAL FUNCTION PANEL: CPT | Performed by: INTERNAL MEDICINE

## 2023-12-26 PROCEDURE — 10002800 HB RX 250 W HCPCS: Performed by: INTERNAL MEDICINE

## 2023-12-26 PROCEDURE — 84450 TRANSFERASE (AST) (SGOT): CPT | Performed by: HOSPITALIST

## 2023-12-26 RX ORDER — LANOLIN ALCOHOL/MO/W.PET/CERES
400 CREAM (GRAM) TOPICAL ONCE
Status: COMPLETED | OUTPATIENT
Start: 2023-12-26 | End: 2023-12-26

## 2023-12-26 RX ORDER — FUROSEMIDE 20 MG/1
20 TABLET ORAL DAILY
Status: DISCONTINUED | OUTPATIENT
Start: 2023-12-27 | End: 2023-12-27 | Stop reason: HOSPADM

## 2023-12-26 RX ADMIN — IPRATROPIUM BROMIDE AND ALBUTEROL SULFATE 3 ML: 2.5; .5 SOLUTION RESPIRATORY (INHALATION) at 08:51

## 2023-12-26 RX ADMIN — SODIUM CHLORIDE, PRESERVATIVE FREE 2 ML: 5 INJECTION INTRAVENOUS at 08:31

## 2023-12-26 RX ADMIN — Medication 3 MG: at 21:26

## 2023-12-26 RX ADMIN — AMIODARONE HYDROCHLORIDE 200 MG: 200 TABLET ORAL at 08:30

## 2023-12-26 RX ADMIN — NYSTATIN: 100000 POWDER TOPICAL at 21:28

## 2023-12-26 RX ADMIN — INSULIN LISPRO 1 UNITS: 100 INJECTION, SOLUTION INTRAVENOUS; SUBCUTANEOUS at 17:13

## 2023-12-26 RX ADMIN — NYSTATIN: 100000 POWDER TOPICAL at 08:31

## 2023-12-26 RX ADMIN — ACETAMINOPHEN 650 MG: 325 TABLET ORAL at 15:21

## 2023-12-26 RX ADMIN — APIXABAN 2.5 MG: 2.5 TABLET, FILM COATED ORAL at 08:30

## 2023-12-26 RX ADMIN — SODIUM CHLORIDE, PRESERVATIVE FREE 10 ML: 5 INJECTION INTRAVENOUS at 08:31

## 2023-12-26 RX ADMIN — SODIUM CHLORIDE, PRESERVATIVE FREE 10 ML: 5 INJECTION INTRAVENOUS at 21:26

## 2023-12-26 RX ADMIN — INSULIN LISPRO 1 UNITS: 100 INJECTION, SOLUTION INTRAVENOUS; SUBCUTANEOUS at 12:11

## 2023-12-26 RX ADMIN — APIXABAN 5 MG: 5 TABLET, FILM COATED ORAL at 21:26

## 2023-12-26 RX ADMIN — AMIODARONE HYDROCHLORIDE 200 MG: 200 TABLET ORAL at 21:26

## 2023-12-26 RX ADMIN — Medication 400 MG: at 08:30

## 2023-12-26 RX ADMIN — FUROSEMIDE 20 MG: 10 INJECTION, SOLUTION INTRAMUSCULAR; INTRAVENOUS at 08:30

## 2023-12-26 RX ADMIN — INSULIN LISPRO 1 UNITS: 100 INJECTION, SOLUTION INTRAVENOUS; SUBCUTANEOUS at 08:30

## 2023-12-26 RX ADMIN — SODIUM CHLORIDE, PRESERVATIVE FREE 2 ML: 5 INJECTION INTRAVENOUS at 21:26

## 2023-12-26 RX ADMIN — Medication 400 MG: at 17:13

## 2023-12-26 RX ADMIN — PANTOPRAZOLE SODIUM 40 MG: 40 TABLET, DELAYED RELEASE ORAL at 06:28

## 2023-12-26 RX ADMIN — IPRATROPIUM BROMIDE AND ALBUTEROL SULFATE 3 ML: 2.5; .5 SOLUTION RESPIRATORY (INHALATION) at 03:54

## 2023-12-26 ASSESSMENT — PULMONARY FUNCTION TESTS
FEV1_PREDICTED: 0
FEV1: 0
FEV1: 0
FEV1/FVC: UNABLE TO OBTAIN, OR GREATER THAN 70%
FEV1: 0

## 2023-12-26 ASSESSMENT — PAIN SCALES - GENERAL
PAINLEVEL_OUTOF10: 0
PAINLEVEL_OUTOF10: 0
PAINLEVEL_OUTOF10: 5
PAINLEVEL_OUTOF10: 1

## 2023-12-27 VITALS
HEIGHT: 62 IN | RESPIRATION RATE: 24 BRPM | HEART RATE: 78 BPM | TEMPERATURE: 98.8 F | WEIGHT: 233.91 LBS | OXYGEN SATURATION: 94 % | BODY MASS INDEX: 43.04 KG/M2 | SYSTOLIC BLOOD PRESSURE: 153 MMHG | DIASTOLIC BLOOD PRESSURE: 77 MMHG

## 2023-12-27 LAB
ALBUMIN SERPL-MCNC: 2.8 G/DL (ref 3.6–5.1)
ALP SERPL-CCNC: 93 UNITS/L (ref 45–117)
ALT SERPL-CCNC: 143 UNITS/L
ANION GAP SERPL CALC-SCNC: 7 MMOL/L (ref 7–19)
AST SERPL-CCNC: 49 UNITS/L
BILIRUB CONJ SERPL-MCNC: 0.4 MG/DL (ref 0–0.2)
BILIRUB SERPL-MCNC: 1.6 MG/DL (ref 0.2–1)
BUN SERPL-MCNC: 24 MG/DL (ref 6–20)
BUN/CREAT SERPL: 25 (ref 7–25)
CALCIUM SERPL-MCNC: 9.3 MG/DL (ref 8.4–10.2)
CHLORIDE SERPL-SCNC: 104 MMOL/L (ref 97–110)
CO2 SERPL-SCNC: 32 MMOL/L (ref 21–32)
CREAT SERPL-MCNC: 0.97 MG/DL (ref 0.51–0.95)
EGFRCR SERPLBLD CKD-EPI 2021: 59 ML/MIN/{1.73_M2}
FASTING DURATION TIME PATIENT: ABNORMAL H
GLUCOSE BLDC GLUCOMTR-MCNC: 169 MG/DL (ref 70–99)
GLUCOSE SERPL-MCNC: 155 MG/DL (ref 70–99)
MAGNESIUM SERPL-MCNC: 1.5 MG/DL (ref 1.7–2.4)
PHOSPHATE SERPL-MCNC: 3.4 MG/DL (ref 2.4–4.7)
POTASSIUM SERPL-SCNC: 3.8 MMOL/L (ref 3.4–5.1)
PROT SERPL-MCNC: 5.9 G/DL (ref 6.4–8.2)
SODIUM SERPL-SCNC: 139 MMOL/L (ref 135–145)

## 2023-12-27 PROCEDURE — 84075 ASSAY ALKALINE PHOSPHATASE: CPT | Performed by: HOSPITALIST

## 2023-12-27 PROCEDURE — 10002803 HB RX 637: Performed by: HOSPITALIST

## 2023-12-27 PROCEDURE — 10002800 HB RX 250 W HCPCS: Performed by: INTERNAL MEDICINE

## 2023-12-27 PROCEDURE — 80069 RENAL FUNCTION PANEL: CPT | Performed by: INTERNAL MEDICINE

## 2023-12-27 PROCEDURE — 82248 BILIRUBIN DIRECT: CPT | Performed by: HOSPITALIST

## 2023-12-27 PROCEDURE — 10002803 HB RX 637: Performed by: INTERNAL MEDICINE

## 2023-12-27 PROCEDURE — 10004651 HB RX, NO CHARGE ITEM: Performed by: INTERNAL MEDICINE

## 2023-12-27 PROCEDURE — 83735 ASSAY OF MAGNESIUM: CPT | Performed by: HOSPITALIST

## 2023-12-27 PROCEDURE — 84155 ASSAY OF PROTEIN SERUM: CPT | Performed by: HOSPITALIST

## 2023-12-27 PROCEDURE — 84450 TRANSFERASE (AST) (SGOT): CPT | Performed by: HOSPITALIST

## 2023-12-27 PROCEDURE — 10002803 HB RX 637: Performed by: PHYSICIAN ASSISTANT

## 2023-12-27 PROCEDURE — 82247 BILIRUBIN TOTAL: CPT | Performed by: HOSPITALIST

## 2023-12-27 PROCEDURE — 96372 THER/PROPH/DIAG INJ SC/IM: CPT | Performed by: INTERNAL MEDICINE

## 2023-12-27 PROCEDURE — 84460 ALANINE AMINO (ALT) (SGPT): CPT | Performed by: HOSPITALIST

## 2023-12-27 RX ORDER — PANTOPRAZOLE SODIUM 40 MG/1
40 TABLET, DELAYED RELEASE ORAL
Qty: 30 TABLET | Refills: 2 | Status: SHIPPED | OUTPATIENT
Start: 2023-12-28

## 2023-12-27 RX ORDER — LANOLIN ALCOHOL/MO/W.PET/CERES
400 CREAM (GRAM) TOPICAL ONCE
Status: DISCONTINUED | OUTPATIENT
Start: 2023-12-27 | End: 2023-12-27 | Stop reason: HOSPADM

## 2023-12-27 RX ORDER — LANOLIN ALCOHOL/MO/W.PET/CERES
400 CREAM (GRAM) TOPICAL ONCE
Status: COMPLETED | OUTPATIENT
Start: 2023-12-27 | End: 2023-12-27

## 2023-12-27 RX ORDER — NYSTATIN 100000 [USP'U]/G
POWDER TOPICAL 3 TIMES DAILY
Qty: 30 G | Refills: 1 | Status: SHIPPED | OUTPATIENT
Start: 2023-12-27 | End: 2024-01-10

## 2023-12-27 RX ADMIN — AMIODARONE HYDROCHLORIDE 200 MG: 200 TABLET ORAL at 08:56

## 2023-12-27 RX ADMIN — APIXABAN 5 MG: 5 TABLET, FILM COATED ORAL at 08:56

## 2023-12-27 RX ADMIN — NYSTATIN: 100000 POWDER TOPICAL at 08:57

## 2023-12-27 RX ADMIN — SODIUM CHLORIDE, PRESERVATIVE FREE 2 ML: 5 INJECTION INTRAVENOUS at 08:57

## 2023-12-27 RX ADMIN — FUROSEMIDE 20 MG: 20 TABLET ORAL at 08:57

## 2023-12-27 RX ADMIN — PANTOPRAZOLE SODIUM 40 MG: 40 TABLET, DELAYED RELEASE ORAL at 04:57

## 2023-12-27 RX ADMIN — Medication 400 MG: at 08:57

## 2023-12-27 RX ADMIN — INSULIN LISPRO 1 UNITS: 100 INJECTION, SOLUTION INTRAVENOUS; SUBCUTANEOUS at 08:58

## 2023-12-27 RX ADMIN — SODIUM CHLORIDE, PRESERVATIVE FREE 10 ML: 5 INJECTION INTRAVENOUS at 08:57

## 2023-12-27 ASSESSMENT — PAIN SCALES - WONG BAKER: WONGBAKER_NUMERICALRESPONSE: 0

## 2023-12-27 ASSESSMENT — PAIN SCALES - GENERAL: PAINLEVEL_OUTOF10: 0

## 2023-12-29 ENCOUNTER — APPOINTMENT (OUTPATIENT)
Dept: GENERAL RADIOLOGY | Facility: HOSPITAL | Age: 78
End: 2023-12-29
Payer: MEDICARE

## 2023-12-29 ENCOUNTER — HOSPITAL ENCOUNTER (INPATIENT)
Facility: HOSPITAL | Age: 78
LOS: 5 days | Discharge: HOME HEALTH | End: 2024-01-03
Attending: EMERGENCY MEDICINE
Payer: MEDICARE

## 2023-12-29 ENCOUNTER — HOSPITAL ENCOUNTER (INPATIENT)
Facility: HOSPITAL | Age: 78
LOS: 5 days | Discharge: HOME HEALTH | End: 2024-01-03
Attending: EMERGENCY MEDICINE | Admitting: INTERNAL MEDICINE
Payer: MEDICARE

## 2023-12-29 DIAGNOSIS — R06.89 RESPIRATORY INSUFFICIENCY: ICD-10-CM

## 2023-12-29 DIAGNOSIS — J81.0 ACUTE PULMONARY EDEMA (HCC): Primary | ICD-10-CM

## 2023-12-29 DIAGNOSIS — E87.6 HYPOKALEMIA: ICD-10-CM

## 2023-12-29 DIAGNOSIS — J90 PLEURAL EFFUSION: ICD-10-CM

## 2023-12-29 PROBLEM — R73.9 HYPERGLYCEMIA: Status: ACTIVE | Noted: 2023-12-29

## 2023-12-29 PROBLEM — E87.3 METABOLIC ALKALOSIS: Status: ACTIVE | Noted: 2023-12-29

## 2023-12-29 LAB
ALBUMIN SERPL-MCNC: 3.9 G/DL (ref 3.2–4.8)
ALBUMIN/GLOB SERPL: 1.3 {RATIO} (ref 1–2)
ALP LIVER SERPL-CCNC: 88 U/L
ALT SERPL-CCNC: 69 U/L
ANION GAP SERPL CALC-SCNC: 3 MMOL/L (ref 0–18)
APTT PPP: 31.5 SECONDS (ref 23.3–35.6)
AST SERPL-CCNC: 29 U/L (ref ?–34)
BASOPHILS # BLD AUTO: 0.07 X10(3) UL (ref 0–0.2)
BASOPHILS NFR BLD AUTO: 1 %
BILIRUB SERPL-MCNC: 1.4 MG/DL (ref 0.2–1.1)
BNP SERPL-MCNC: 1205 PG/ML
BUN BLD-MCNC: 16 MG/DL (ref 9–23)
BUN/CREAT SERPL: 13.2 (ref 10–20)
CALCIUM BLD-MCNC: 9.4 MG/DL (ref 8.7–10.4)
CHLORIDE SERPL-SCNC: 102 MMOL/L (ref 98–112)
CO2 SERPL-SCNC: 34 MMOL/L (ref 21–32)
CREAT BLD-MCNC: 1.21 MG/DL
DEPRECATED RDW RBC AUTO: 45.7 FL (ref 35.1–46.3)
EGFRCR SERPLBLD CKD-EPI 2021: 45 ML/MIN/1.73M2 (ref 60–?)
EOSINOPHIL # BLD AUTO: 0.18 X10(3) UL (ref 0–0.7)
EOSINOPHIL NFR BLD AUTO: 2.5 %
ERYTHROCYTE [DISTWIDTH] IN BLOOD BY AUTOMATED COUNT: 14 % (ref 11–15)
GLOBULIN PLAS-MCNC: 2.9 G/DL (ref 2.8–4.4)
GLUCOSE BLD-MCNC: 158 MG/DL (ref 70–99)
HCT VFR BLD AUTO: 44.9 %
HGB BLD-MCNC: 14.1 G/DL
IMM GRANULOCYTES # BLD AUTO: 0.02 X10(3) UL (ref 0–1)
IMM GRANULOCYTES NFR BLD: 0.3 %
INR BLD: 1.43 (ref 0.8–1.2)
LYMPHOCYTES # BLD AUTO: 1.35 X10(3) UL (ref 1–4)
LYMPHOCYTES NFR BLD AUTO: 18.4 %
MCH RBC QN AUTO: 28.2 PG (ref 26–34)
MCHC RBC AUTO-ENTMCNC: 31.4 G/DL (ref 31–37)
MCV RBC AUTO: 89.8 FL
MONOCYTES # BLD AUTO: 0.92 X10(3) UL (ref 0.1–1)
MONOCYTES NFR BLD AUTO: 12.6 %
NEUTROPHILS # BLD AUTO: 4.79 X10 (3) UL (ref 1.5–7.7)
NEUTROPHILS # BLD AUTO: 4.79 X10(3) UL (ref 1.5–7.7)
NEUTROPHILS NFR BLD AUTO: 65.2 %
OSMOLALITY SERPL CALC.SUM OF ELEC: 292 MOSM/KG (ref 275–295)
PLATELET # BLD AUTO: 275 10(3)UL (ref 150–450)
POTASSIUM SERPL-SCNC: 3.3 MMOL/L (ref 3.5–5.1)
PROT SERPL-MCNC: 6.8 G/DL (ref 5.7–8.2)
PROTHROMBIN TIME: 18.3 SECONDS (ref 11.6–14.8)
RBC # BLD AUTO: 5 X10(6)UL
SODIUM SERPL-SCNC: 139 MMOL/L (ref 136–145)
TROPONIN I SERPL HS-MCNC: 29 NG/L
WBC # BLD AUTO: 7.3 X10(3) UL (ref 4–11)

## 2023-12-29 PROCEDURE — 93005 ELECTROCARDIOGRAM TRACING: CPT

## 2023-12-29 PROCEDURE — 99285 EMERGENCY DEPT VISIT HI MDM: CPT

## 2023-12-29 PROCEDURE — 93010 ELECTROCARDIOGRAM REPORT: CPT

## 2023-12-29 PROCEDURE — 84484 ASSAY OF TROPONIN QUANT: CPT | Performed by: EMERGENCY MEDICINE

## 2023-12-29 PROCEDURE — 80053 COMPREHEN METABOLIC PANEL: CPT | Performed by: EMERGENCY MEDICINE

## 2023-12-29 PROCEDURE — 85730 THROMBOPLASTIN TIME PARTIAL: CPT | Performed by: EMERGENCY MEDICINE

## 2023-12-29 PROCEDURE — 71045 X-RAY EXAM CHEST 1 VIEW: CPT

## 2023-12-29 PROCEDURE — 85610 PROTHROMBIN TIME: CPT | Performed by: EMERGENCY MEDICINE

## 2023-12-29 PROCEDURE — 85025 COMPLETE CBC W/AUTO DIFF WBC: CPT | Performed by: EMERGENCY MEDICINE

## 2023-12-29 PROCEDURE — 96374 THER/PROPH/DIAG INJ IV PUSH: CPT

## 2023-12-29 PROCEDURE — 83880 ASSAY OF NATRIURETIC PEPTIDE: CPT | Performed by: EMERGENCY MEDICINE

## 2023-12-29 PROCEDURE — 83036 HEMOGLOBIN GLYCOSYLATED A1C: CPT | Performed by: HOSPITALIST

## 2023-12-29 RX ORDER — ATORVASTATIN CALCIUM 40 MG/1
40 TABLET, FILM COATED ORAL DAILY
Status: DISCONTINUED | OUTPATIENT
Start: 2023-12-30 | End: 2024-01-03

## 2023-12-29 RX ORDER — ACETAMINOPHEN 500 MG
500 TABLET ORAL EVERY 4 HOURS PRN
Status: DISCONTINUED | OUTPATIENT
Start: 2023-12-29 | End: 2024-01-03

## 2023-12-29 RX ORDER — ONDANSETRON 2 MG/ML
4 INJECTION INTRAMUSCULAR; INTRAVENOUS EVERY 6 HOURS PRN
Status: DISCONTINUED | OUTPATIENT
Start: 2023-12-29 | End: 2024-01-03

## 2023-12-29 RX ORDER — ENEMA 19; 7 G/133ML; G/133ML
1 ENEMA RECTAL ONCE AS NEEDED
Status: DISCONTINUED | OUTPATIENT
Start: 2023-12-29 | End: 2024-01-03

## 2023-12-29 RX ORDER — POTASSIUM CHLORIDE 20 MEQ/1
40 TABLET, EXTENDED RELEASE ORAL ONCE
Status: COMPLETED | OUTPATIENT
Start: 2023-12-29 | End: 2023-12-29

## 2023-12-29 RX ORDER — POLYETHYLENE GLYCOL 3350 17 G/17G
17 POWDER, FOR SOLUTION ORAL DAILY PRN
Status: DISCONTINUED | OUTPATIENT
Start: 2023-12-29 | End: 2024-01-03

## 2023-12-29 RX ORDER — HEPARIN SODIUM 5000 [USP'U]/ML
5000 INJECTION, SOLUTION INTRAVENOUS; SUBCUTANEOUS EVERY 8 HOURS SCHEDULED
Status: DISCONTINUED | OUTPATIENT
Start: 2023-12-30 | End: 2023-12-30

## 2023-12-29 RX ORDER — NICOTINE POLACRILEX 4 MG
15 LOZENGE BUCCAL
Status: DISCONTINUED | OUTPATIENT
Start: 2023-12-29 | End: 2024-01-03

## 2023-12-29 RX ORDER — MELATONIN
3 NIGHTLY PRN
Status: DISCONTINUED | OUTPATIENT
Start: 2023-12-29 | End: 2024-01-03

## 2023-12-29 RX ORDER — DEXTROSE MONOHYDRATE 25 G/50ML
50 INJECTION, SOLUTION INTRAVENOUS
Status: DISCONTINUED | OUTPATIENT
Start: 2023-12-29 | End: 2024-01-03

## 2023-12-29 RX ORDER — METOCLOPRAMIDE HYDROCHLORIDE 5 MG/ML
5 INJECTION INTRAMUSCULAR; INTRAVENOUS EVERY 8 HOURS PRN
Status: DISCONTINUED | OUTPATIENT
Start: 2023-12-29 | End: 2024-01-03

## 2023-12-29 RX ORDER — FUROSEMIDE 10 MG/ML
40 INJECTION INTRAMUSCULAR; INTRAVENOUS ONCE
Status: COMPLETED | OUTPATIENT
Start: 2023-12-29 | End: 2023-12-29

## 2023-12-29 RX ORDER — NICOTINE POLACRILEX 4 MG
30 LOZENGE BUCCAL
Status: DISCONTINUED | OUTPATIENT
Start: 2023-12-29 | End: 2024-01-03

## 2023-12-29 RX ORDER — SENNOSIDES 8.6 MG
17.2 TABLET ORAL NIGHTLY PRN
Status: DISCONTINUED | OUTPATIENT
Start: 2023-12-29 | End: 2024-01-03

## 2023-12-29 RX ORDER — BISACODYL 10 MG
10 SUPPOSITORY, RECTAL RECTAL
Status: DISCONTINUED | OUTPATIENT
Start: 2023-12-29 | End: 2024-01-03

## 2023-12-29 RX ORDER — FUROSEMIDE 10 MG/ML
40 INJECTION INTRAMUSCULAR; INTRAVENOUS ONCE
Status: DISCONTINUED | OUTPATIENT
Start: 2023-12-30 | End: 2023-12-30

## 2023-12-30 LAB
ALBUMIN SERPL-MCNC: 3.5 G/DL (ref 3.2–4.8)
ALBUMIN/GLOB SERPL: 1.5 {RATIO} (ref 1–2)
ALP LIVER SERPL-CCNC: 70 U/L
ALT SERPL-CCNC: 53 U/L
ANION GAP SERPL CALC-SCNC: 5 MMOL/L (ref 0–18)
AST SERPL-CCNC: 25 U/L (ref ?–34)
ATRIAL RATE: 74 BPM
BASOPHILS # BLD AUTO: 0.04 X10(3) UL (ref 0–0.2)
BASOPHILS NFR BLD AUTO: 0.6 %
BILIRUB SERPL-MCNC: 1.2 MG/DL (ref 0.2–1.1)
BUN BLD-MCNC: 13 MG/DL (ref 9–23)
BUN/CREAT SERPL: 13.8 (ref 10–20)
CALCIUM BLD-MCNC: 8.9 MG/DL (ref 8.7–10.4)
CHLORIDE SERPL-SCNC: 103 MMOL/L (ref 98–112)
CO2 SERPL-SCNC: 34 MMOL/L (ref 21–32)
CREAT BLD-MCNC: 0.94 MG/DL
DEPRECATED RDW RBC AUTO: 45 FL (ref 35.1–46.3)
EGFRCR SERPLBLD CKD-EPI 2021: 61 ML/MIN/1.73M2 (ref 60–?)
EOSINOPHIL # BLD AUTO: 0.3 X10(3) UL (ref 0–0.7)
EOSINOPHIL NFR BLD AUTO: 4.8 %
ERYTHROCYTE [DISTWIDTH] IN BLOOD BY AUTOMATED COUNT: 13.8 % (ref 11–15)
EST. AVERAGE GLUCOSE BLD GHB EST-MCNC: 177 MG/DL (ref 68–126)
GLOBULIN PLAS-MCNC: 2.4 G/DL (ref 2.8–4.4)
GLUCOSE BLD-MCNC: 124 MG/DL (ref 70–99)
GLUCOSE BLDC GLUCOMTR-MCNC: 118 MG/DL (ref 70–99)
GLUCOSE BLDC GLUCOMTR-MCNC: 123 MG/DL (ref 70–99)
GLUCOSE BLDC GLUCOMTR-MCNC: 128 MG/DL (ref 70–99)
GLUCOSE BLDC GLUCOMTR-MCNC: 133 MG/DL (ref 70–99)
GLUCOSE BLDC GLUCOMTR-MCNC: 144 MG/DL (ref 70–99)
GLUCOSE BLDC GLUCOMTR-MCNC: 185 MG/DL (ref 70–99)
HBA1C MFR BLD: 7.8 % (ref ?–5.7)
HCT VFR BLD AUTO: 40.1 %
HGB BLD-MCNC: 12.6 G/DL
IMM GRANULOCYTES # BLD AUTO: 0.01 X10(3) UL (ref 0–1)
IMM GRANULOCYTES NFR BLD: 0.2 %
LYMPHOCYTES # BLD AUTO: 1.36 X10(3) UL (ref 1–4)
LYMPHOCYTES NFR BLD AUTO: 21.8 %
MAGNESIUM SERPL-MCNC: 1.4 MG/DL (ref 1.6–2.6)
MCH RBC QN AUTO: 28.1 PG (ref 26–34)
MCHC RBC AUTO-ENTMCNC: 31.4 G/DL (ref 31–37)
MCV RBC AUTO: 89.5 FL
MONOCYTES # BLD AUTO: 0.95 X10(3) UL (ref 0.1–1)
MONOCYTES NFR BLD AUTO: 15.2 %
NEUTROPHILS # BLD AUTO: 3.59 X10 (3) UL (ref 1.5–7.7)
NEUTROPHILS # BLD AUTO: 3.59 X10(3) UL (ref 1.5–7.7)
NEUTROPHILS NFR BLD AUTO: 57.4 %
OSMOLALITY SERPL CALC.SUM OF ELEC: 296 MOSM/KG (ref 275–295)
P AXIS: 82 DEGREES
P-R INTERVAL: 120 MS
PLATELET # BLD AUTO: 244 10(3)UL (ref 150–450)
POTASSIUM SERPL-SCNC: 3.7 MMOL/L (ref 3.5–5.1)
PROT SERPL-MCNC: 5.9 G/DL (ref 5.7–8.2)
Q-T INTERVAL: 420 MS
QRS DURATION: 72 MS
QTC CALCULATION (BEZET): 469 MS
R AXIS: 126 DEGREES
RBC # BLD AUTO: 4.48 X10(6)UL
SODIUM SERPL-SCNC: 142 MMOL/L (ref 136–145)
T AXIS: 125 DEGREES
VENTRICULAR RATE: 75 BPM
WBC # BLD AUTO: 6.3 X10(3) UL (ref 4–11)

## 2023-12-30 PROCEDURE — 97535 SELF CARE MNGMENT TRAINING: CPT

## 2023-12-30 PROCEDURE — 82962 GLUCOSE BLOOD TEST: CPT

## 2023-12-30 PROCEDURE — 85025 COMPLETE CBC W/AUTO DIFF WBC: CPT | Performed by: HOSPITALIST

## 2023-12-30 PROCEDURE — 80053 COMPREHEN METABOLIC PANEL: CPT | Performed by: HOSPITALIST

## 2023-12-30 PROCEDURE — 97165 OT EVAL LOW COMPLEX 30 MIN: CPT

## 2023-12-30 PROCEDURE — 83735 ASSAY OF MAGNESIUM: CPT | Performed by: HOSPITALIST

## 2023-12-30 PROCEDURE — 97161 PT EVAL LOW COMPLEX 20 MIN: CPT

## 2023-12-30 PROCEDURE — 97116 GAIT TRAINING THERAPY: CPT

## 2023-12-30 RX ORDER — MAGNESIUM OXIDE 400 MG/1
800 TABLET ORAL ONCE
Status: COMPLETED | OUTPATIENT
Start: 2023-12-30 | End: 2023-12-30

## 2023-12-30 RX ORDER — AMIODARONE HYDROCHLORIDE 200 MG/1
400 TABLET ORAL EVERY 12 HOURS
Status: DISCONTINUED | OUTPATIENT
Start: 2023-12-30 | End: 2024-01-03

## 2023-12-30 RX ORDER — FUROSEMIDE 20 MG/1
20 TABLET ORAL DAILY
COMMUNITY
End: 2024-01-03

## 2023-12-30 RX ORDER — SERTRALINE HYDROCHLORIDE 25 MG/1
50 TABLET, FILM COATED ORAL DAILY
Status: DISCONTINUED | OUTPATIENT
Start: 2023-12-30 | End: 2024-01-03

## 2023-12-30 RX ORDER — NYSTATIN 100000 [USP'U]/G
1 POWDER TOPICAL 3 TIMES DAILY
COMMUNITY

## 2023-12-30 RX ORDER — FUROSEMIDE 10 MG/ML
40 INJECTION INTRAMUSCULAR; INTRAVENOUS
Status: DISCONTINUED | OUTPATIENT
Start: 2023-12-30 | End: 2024-01-02

## 2023-12-30 RX ORDER — POTASSIUM CHLORIDE 20 MEQ/1
40 TABLET, EXTENDED RELEASE ORAL ONCE
Status: COMPLETED | OUTPATIENT
Start: 2023-12-30 | End: 2023-12-30

## 2023-12-30 RX ORDER — CLOTRIMAZOLE AND BETAMETHASONE DIPROPIONATE 10; .64 MG/G; MG/G
1 CREAM TOPICAL 2 TIMES DAILY
COMMUNITY

## 2023-12-30 RX ORDER — AMIODARONE HYDROCHLORIDE 200 MG/1
400 TABLET ORAL EVERY 12 HOURS
COMMUNITY

## 2023-12-30 NOTE — PLAN OF CARE
Patient is alert & oriented x4 on 2L of O2. Vital signs stable at this time. No acute changes noted throughout shift; patient slept. Fall precautions maintained - bed alarm on, bed locked in lowest position, call light and personal belongings within reach, non-skid socks in place. Frequent rounding by nursing staff. Plan echo.     Problem: Patient Centered Care  Goal: Patient preferences are identified and integrated in the patient's plan of care  Description: Interventions:  - What would you like us to know as we care for you? From home alone  - Provide timely, complete, and accurate information to patient/family  - Incorporate patient and family knowledge, values, beliefs, and cultural backgrounds into the planning and delivery of care  - Encourage patient/family to participate in care and decision-making at the level they choose  - Honor patient and family perspectives and choices  Outcome: Progressing     Problem: Diabetes/Glucose Control  Goal: Glucose maintained within prescribed range  Description: INTERVENTIONS:  - Monitor Blood Glucose as ordered  - Assess for signs and symptoms of hyperglycemia and hypoglycemia  - Administer ordered medications to maintain glucose within target range  - Assess barriers to adequate nutritional intake and initiate nutrition consult as needed  - Instruct patient on self management of diabetes  Outcome: Progressing     Problem: Patient/Family Goals  Goal: Patient/Family Long Term Goal  Description: Patient's Long Term Goal: to go home    Interventions:  - follow md orders   - See additional Care Plan goals for specific interventions  Outcome: Progressing  Goal: Patient/Family Short Term Goal  Description: Patient's Short Term Goal: manage sob    Interventions:   - follow md orders  - See additional Care Plan goals for specific interventions  Outcome: Progressing

## 2023-12-30 NOTE — OCCUPATIONAL THERAPY NOTE
OCCUPATIONAL THERAPY EVALUATION - INPATIENT     Room Number: COF15/COF15-A  Evaluation Date: 12/30/2023  Type of Evaluation: Initial  Presenting Problem: Acute pulmonary edema    Physician Order: IP Consult to Occupational Therapy  Reason for Therapy: ADL/IADL Dysfunction and Discharge Planning    OCCUPATIONAL THERAPY ASSESSMENT   Patient is a 78 year old female admitted 12/29/2023 for acute pulmonary edeman.  In this OT evaluation patient presents with the following impairments: activity tolerance, deconditioning.  These deficits manifest functionally while performing ADL.   The patient is below baseline and would benefit from skilled inpatient OT to address the above deficits, maximizing patient's ability to return to prior level of function.    The patient's Approx Degree of Impairment: 46.65% has been calculated based on documentation in the [facility] '6 clicks' Inpatient Daily Activity Short Form.  Research supports that patients with this level of impairment may benefit from return home with  services; pt is doing well overall but is below her baseline for tolerance for daily activities, she will benefit form continued therapy in [facility] for return home; pt is overall at SBA/SPV level for most basic self care but does require assist for LE dressing 2/2 swelling and limited functional reach; pt ambulatory to bathroom with SBA; toileting tasks completed with SBA, toilet t/f with SPV; pt ambulatory in hallway reasonable household distances; desaturated on RA to 86% but improved to >90% when 2L placed back on in <1 minute (pt is on RA at baseline); pt left up in chair with all needs in reach; stable at exit. Continue skilled occupational therapy while IP to maximize patient function and increase patient participation, safety, and independence with basic ADL and everyday activities.   .     DISCHARGE RECOMMENDATIONS  OT Discharge Recommendations: Home; Home with home health PT/OT  OT Device Recommendations:  None    PLAN  OT Treatment Plan: Balance activities;Energy conservation/work simplification techniques;ADL training;Functional transfer training;Endurance training;Patient/Family education;Patient/Family training;Compensatory technique education       OCCUPATIONAL THERAPY MEDICAL/SOCIAL HISTORY   Problem List  Principal Problem:    Acute pulmonary edema (HCC)  Active Problems:    Hyperglycemia    Hypokalemia    Metabolic alkalosis    Pleural effusion    Respiratory insufficiency    HOME SITUATION  Type of Home: House  Home Layout: Two level  Lives With: Alone  Toilet and Equipment: Standard height toilet  Drives: Yes  Patient Regularly Uses: Hearing aides; Reading glasses  Prior Level of [place]: Mod I with ADLs, IADLs    SUBJECTIVE  I was only home for 2 days!    OCCUPATIONAL THERAPY EXAMINATION    OBJECTIVE  Precautions: Cardiac  Fall Risk: Standard fall risk    PAIN ASSESSMENT  Ratin    ACTIVITY TOLERANCE  Pulse: 84                      O2 SATURATIONS  Oxygen Therapy  SPO2% on Oxygen at Rest: 96  At rest oxygen flow (liters per minute): 2  SPO2% Ambulation on Room Air: 86  SPO2% Ambulation on Oxygen: 96  Ambulation oxygen flow (liters per minute): 2    COGNITION  Overall Cognitive Status:  WFL - within functional limits  Communication: WNL    Behavioral/Emotional/Social: Cooperative     RANGE OF MOTION   Upper extremity ROM is within functional limits     STRENGTH ASSESSMENT  Upper extremity strength is within functional limits     COORDINATION  Gross Motor: WFL   Fine Motor: WFL     ACTIVITIES OF DAILY LIVING ASSESSMENT  AM-PAC ‘6-Clicks’ Inpatient Daily Activity Short Form  How much help from another person does the patient currently need…  -   Putting on and taking off regular lower body clothing?: A Little  -   Bathing (including washing, rinsing, drying)?: A Little  -   Toileting, which includes using toilet, bedpan or urinal? : A Little  -   Putting on and taking off regular upper body clothing?: A  Little  -   Taking care of personal grooming such as brushing teeth?: A Little  -   Eating meals?: A Little    AM-PAC Score:  Score: 18  Approx Degree of Impairment: 46.65%  Standardized Score (AM-PAC Scale): 38.66  CMS Modifier (G-Code): CK    FUNCTIONAL TRANSFER ASSESSMENT  Sit to Stand: Chair  Chair: Stand-by Assist  Toilet Transfer: Stand-by Assist    BED MOBILITY     BALANCE ASSESSMENT  Static Sitting: Supervision  Static Standing: Stand-by Assist    FUNCTIONAL ADL ASSESSMENT  Eating: Independent  Grooming Seated: Supervision  Bathing Seated: Stand-by Assist  UB Dressing Seated: Stand-by Assist  LB Dressing Seated: Independent  Toileting Seated: Stand-by Assist    EDUCATION PROVIDED  Patient : Role of Occupational Therapy; Plan of Care; Discharge Recommendations; Functional Transfer Techniques; Fall Prevention; Compensatory ADL Techniques  Patient's Response to Education: Verbalized Understanding; Returned Demonstration    Patient End of Session: Up in chair;Needs met;Call light within reach;RN aware of session/findings;All patient questions and concerns addressed  OT Goals  Patients self stated goal is: to return home     Patient will complete functional transfer with Mod I  Comment:     Patient will complete toileting with Mod I  Comment:     Patient will tolerate standing for 3-5 minutes in prep for adls with Mod I   Comment:    Patient will complete item retrieval with Mod I  Comment:          Goals  on: 1/15  Frequency: 3x week     Patient Evaluation Complexity Level:   Occupational Profile/Medical History LOW - Brief history including review of medical or therapy records    Specific performance deficits impacting engagement in ADL/IADL LOW  1 - 3 performance deficits    Client Assessment/Performance Deficits LOW - No comorbidities nor modifications of tasks    Clinical Decision Making LOW - Analysis of occupational profile, problem-focused assessments, limited treatment options    Overall Complexity  LOW     OT Session Time: 15 minutes  Self-Care Home Management: 15 minutes    Michael Fernandez, Occupational Therapist, OTR/L ext [phone]

## 2023-12-30 NOTE — ED QUICK NOTES
Orders for admission, patient is aware of plan and ready to go upstairs. Any questions, please call ED RN krupa at extension [phone].     Patient Covid vaccination status: Fully vaccinated     COVID Test Ordered in ED: None    COVID Suspicion at Admission: N/A    Running Infusions:  None    Mental Status/LOC at time of transport: x4    Other pertinent information:   CIWA score: N/A   NIH score:  N/A

## 2023-12-30 NOTE — CM/SW NOTE
Per chart, PT/OT recommend HH for pt at time of DC.    Upon review, pt is from home alone and previously independent w/ all ADL's and IADL's. Per notes, pt plans to move to [place] w/ her dtr in the next few months.    No indication that pt is current w/ HH services.     SW sent HH referrals via [employer]. F2F entered/sent.    PLAN: Home likely w/ HH - pending pt's agreeable, list/choice & med clear      SW/CM to remain available for support and/or discharge planning.         Dotty Voss, MSW, LSW [phone]

## 2023-12-30 NOTE — PROGRESS NOTES
[facility] Pharmacy Note:  Renal Dose Adjustment for Metoclopramide (REGLAN)    Shae Mendez has been prescribed Metoclopramide (REGLAN) 10 mg every 8 hours as needed for nausea/vomiting,.    Estimated Creatinine Clearance: 29.3 mL/min (A) (based on SCr of 1.21 mg/dL (H)).    Calculated creatinine clearance is < 40 ml/min, therefore, the dose of Metoclopramide (REGLAN) has been changed to 5 mg every 8 hours as needed for nausea/vomiting per P&T approved protocol.  Pharmacy will continue to follow, and if renal function improves, will resume the original order.       Thank you,  Carlton Mclaughlin, PharmD  12/30/2023 1:07 AM

## 2023-12-30 NOTE — ED INITIAL ASSESSMENT (HPI)
Patient presents to ED with SOB and rash. States she is hypoxic. Reports rash as well. Recently discharged from [facility] wednesday

## 2023-12-30 NOTE — CONSULTS
Cardiology Consultation  [place]    Shae Mendez Patient Status:  Inpatient    1945 MRN [id number]   Location [facility] 3W/SW Attending David Mcdowell MD   Hosp Day # 1 PCP Fahad Erickson DO     Reason for Consultation:  Shortness of breath    History of Present Illness:  Shae Mendez is a a(n) 78 year old female with  HTN, HLD, type 2 DM,  hs of paricarditis (s/p pericardial window), Afib on eliquis s/p cardioversion on , who presents with sob, and abdominal wall redness.     She was recently admitted to [facility] on  for a cardioversion and was noted to CYNDI along with fungal skin infection underneath her pannus. She was discharged after with lasix 20mg daily which she did not yet take. She presented to [place] due to LE edema and CALLAHAN that she feels is new. CXR here shows small to moderate left pleural effusion with hazy left basilar opacities. Also showed some pulmonary vascular congestion and basilar interstitial edema. BNP was 1203.    She is diuresing well with IV lasix 40mg with UOP of 2.6L.    Assessment/Plan:  # Acute decompensated CHF  LVEF mildly reduced at 45-50%.    -- Agree with IV lasix BID  -- BMP daily  -- I/O daily    # Paroxysmal afib  Now in NSR with frequent PACs    -- Continue amio 400mg BID  -- Eliquis 5mg BID    History:  Past Medical History:   Diagnosis Date    Anxiety state, unspecified     Breast cancer (HCC) 2020    LEFT breast (invasive mammary carcinoma)    Choroidal nevus of right eye 7/10/2017    Chronic vulvitis 2018    Combined forms of age-related cataract of both eyes 7/10/2017    Diverticulitis of colon (without mention of hemorrhage)(562.11)     Esophageal reflux     Gallstones 2016    High blood pressure     High cholesterol     IBS (irritable bowel syndrome)     Obesity, unspecified     Other and unspecified hyperlipidemia     Pericarditis     Personal history of colonic polyps     Ptosis of both  eyelids 2018    SNHL (sensorineural hearing loss) 3/19/2016    Tinnitus, bilateral 3/19/2016    Type II or unspecified type diabetes mellitus without mention of complication, not stated as uncontrolled     Unspecified essential hypertension     Unspecified sleep apnea      Past Surgical History:   Procedure Laterality Date    BREAST LUMPECTOMY Left 10/30/2020    Dr. Mosley      ,     CATARACTS, OPHTHM ([facility]      BILATERAL    COLECTOMY      COLONOSCOPY N/A 3/23/2022    Procedure: Colonoscopy with cold snare polypectomy and placement of 3 [employer] 360 hemostatic clips.;  Surgeon: Fidencio Crockett MD;  Location: [facility]    HERNIA SURGERY  8/20/15    [facility] by Dr. Jackson,    LUMPECTOMY LEFT  10/2020    invasive mammary carcinoma     OTHER SURGICAL HISTORY      oophorectomy    OTHER SURGICAL HISTORY      cardio window    OTHER SURGICAL HISTORY      lap low anterior resection    RADIATION LEFT      [facility]    TOTAL KNEE REPLACEMENT Right      Family History   Problem Relation Age of Onset    Diabetes Other     Other (Other) Other     Other (LEILA) Brother     Other (Other) Mother         embolism      reports that she quit smoking about 30 years ago. Her smoking use included cigarettes. She has a 20.00 pack-year smoking history. She has never used smokeless tobacco. She reports current alcohol use. She reports that she does not use drugs.    Allergies:  Allergies   Allergen Reactions    Erythromycin     Penicillins     Sulfa Antibiotics        Medications:  No current facility-administered medications on file prior to encounter.     Current Outpatient Medications on File Prior to Encounter   Medication Sig Dispense Refill    amiodarone 200 MG Oral Tab Take 2 tablets (400 mg total) by mouth every 12 (twelve) hours.      apixaban 5 MG Oral Tab Take 1 tablet (5 mg total) by mouth 2 (two) times daily.      clotrimazole-betamethasone 1-0.05 % External Cream  Apply 1 Application topically 2 (two) times daily.      furosemide 20 MG Oral Tab Take 1 tablet (20 mg total) by mouth daily.      Nystatin 170159 UNIT/GM External Powder Apply 1 Application topically 3 (three) times daily.      Pantoprazole Sodium (PROTONIX OR) Take 40 mg by mouth daily.      metFORMIN  MG Oral Tablet 24 Hr Take 1 tablet (500 mg total) by mouth daily with breakfast. 90 tablet 1    atorvastatin 40 MG Oral Tab Take 1 tablet (40 mg total) by mouth daily. 90 tablet 3    Olmesartan Medoxomil 40 MG Oral Tab Take 1 tablet (40 mg total) by mouth daily. 90 tablet 3    Sertraline HCl 25 MG Oral Tab Take 1 tablet (25 mg total) by mouth daily. (Patient taking differently: Take 2 tablets (50 mg total) by mouth daily.) 90 tablet 1    Fluticasone Propionate (FLONASE) 50 MCG/ACT Nasal Suspension USE TWO SPRAY(S) IN EACH NOSTRIL ONCE DAILY 3 Inhaler 5    indapamide 2.5 MG Oral Tab TAKE 1 TABLET BY MOUTH IN THE MORNING 90 tablet 3    moxifloxacin 0.5 % Ophthalmic Solution       Ketorolac Tromethamine 0.4 % Ophthalmic Solution       Glucose Blood (FREESTYLE LITE TEST) In Vitro Strip 1 strip by In Vitro route daily as needed. 100 strip 3    ofloxacin 0.3 % Ophthalmic Solution 1 drop 4 times a day to operated eye starting 1 day prior to surgery and every 2 hours after surgery 1 Bottle 1    prednisoLONE acetate (PRED FORTE) 1 % Ophthalmic Suspension 1 drop to affected eye QID 1 Bottle 2    Cholecalciferol (VITAMIN D3) 50 MCG (2000 UT) Oral Tab Take 4,000 mcg by mouth.      Blood Glucose Monitoring Suppl Does not apply Kit 1 each by Other route daily. 1 kit 0    FreeStyle Lancets Does not apply Misc Check blood sugar once a day 100 each 0    B Complex Vitamins (VITAMIN B COMPLEX OR) Inject  as directed.         Review of Systems:  Constitutional: denies fevers, chills, night sweats  HEENT: denies headache, vision changes, trouble or pain with swallowing  Cardiac: denies chest pain, palpitations, edema  Pulm: denies  dyspnea, cough, wheeze  GI: denies n/v, abd pain, diarrhea or constipation  : denies hematuria, dysuria, incontinence  MSK: denies muscle or joint pains  Neuro: denies numbness, weakness, paresthesias  Psych: denies anxiety, depression  Integument: denies skin rashes or lesions  Heme: denies easy bruising or bleeding  Endo: denies heat/cold intolerance, skin or nail changes      Physical Exam:  Blood pressure 150/90, pulse 84, temperature 98.6 °F (37 °C), temperature source Oral, resp. rate 19, height 5' 2\" (1.575 m), weight 231 lb 14.4 oz (105.2 kg), SpO2 93%.  Wt Readings from Last 3 Encounters:   12/30/23 231 lb 14.4 oz (105.2 kg)   03/23/22 223 lb (101.2 kg)   01/27/22 225 lb (102.1 kg)       General: awake, alert, oriented x 3, no acute distress  HEENT: at/nc, perrl, eomi  Neck: No JVD, carotids 2+ no bruits.  Cardiac: Regular rate and rhythm, S1, S2 normal, no murmur, rub or gallop.  Lungs: Clear without wheezes, rales, rhonchi or dullness.  Normal excursions and effort.  Abdomen: Soft, non-tender, non-distended, normal bowel sounds   Extremities: Without clubbing, cyanosis Peripheral pulses are 2+. +1 bilat pitting edema  Neurologic: Alert and oriented, normal affect.  Psych: normal mood and affect  Skin: Warm and dry.       Laboratories and Data:  Diagnostics:    Labs:   CBC:    Lab Results   Component Value Date    WBC 6.3 12/30/2023    WBC 7.3 12/29/2023    WBC 5.76 10/01/2021     Lab Results   Component Value Date    HEMOGLOBIN 13.6 10/28/2014    HEMOGLOBIN 14.2 07/18/2014    HEMOGLOBIN 14.4 05/21/2014    HGB 12.6 12/30/2023    HGB 14.1 12/29/2023    HGB 14.7 10/01/2021      Lab Results   Component Value Date    .0 12/30/2023    .0 12/29/2023     10/01/2021     BMP:     Lab Results   Component Value Date    GLUCOSE 120 (H) 10/28/2014    GLUCOSE 114 (H) 07/18/2014    GLUCOSE 140 (H) 05/21/2014     Lab Results   Component Value Date    K 3.7 12/30/2023    K 3.3 (L) 12/29/2023    K  4.08 03/19/2022     Lab Results   Component Value Date    BUN 13 12/30/2023    BUN 16 12/29/2023    BUN 13.0 03/19/2022     Lab Results   Component Value Date    CREATSERUM 0.94 12/30/2023    CREATSERUM 1.21 (H) 12/29/2023    CREATSERUM 0.54 03/19/2022     Cholesterol:     Lab Results   Component Value Date    CHOLEST 173.00 03/19/2022    CHOLEST 219.00 (H) 10/01/2021    CHOLEST 164.00 08/17/2020     Lab Results   Component Value Date    HDL 55 03/19/2022    HDL 60 10/01/2021    HDL 53 08/17/2020     Lab Results   Component Value Date    TRIG 190.00 (H) 03/19/2022    TRIG 197.00 (H) 10/01/2021    TRIG 172.00 (H) 08/17/2020    TRIGLY 173 (H) 10/28/2014    TRIGLY 135 05/21/2014    TRIGLY 209 (H) 03/04/2014     Lab Results   Component Value Date    LDL 80 03/19/2022     10/01/2021    LDL 77 08/17/2020     Lab Results   Component Value Date    AST 25 12/30/2023    AST 29 12/29/2023    AST 18 03/19/2022     Lab Results   Component Value Date    ALT 53 (H) 12/30/2023    ALT 69 (H) 12/29/2023    ALT 12 03/19/2022           Derik Arguelles MD  Interventional Cardiology  [place]  12/30/2023  2:14 PM

## 2023-12-30 NOTE — PLAN OF CARE
2L of o2. Pt states that her breathing is okay. IV lasix, pt urinating frequently. Call light within reach. Safety precautions in place. Plan: Continue diuresis.   Problem: Patient Centered Care  Goal: Patient preferences are identified and integrated in the patient's plan of care  Description: Interventions:  - What would you like us to know as we care for you? From Home alone   - Provide timely, complete, and accurate information to patient/family  - Incorporate patient and family knowledge, values, beliefs, and cultural backgrounds into the planning and delivery of care  - Encourage patient/family to participate in care and decision-making at the level they choose  - Honor patient and family perspectives and choices  12/30/2023 1053 by Aviva Hubbard  Outcome: Progressing  12/30/2023 1051 by Aviva Hubbard  Outcome: Progressing     Problem: Diabetes/Glucose Control  Goal: Glucose maintained within prescribed range  Description: INTERVENTIONS:  - Monitor Blood Glucose as ordered  - Assess for signs and symptoms of hyperglycemia and hypoglycemia  - Administer ordered medications to maintain glucose within target range  - Assess barriers to adequate nutritional intake and initiate nutrition consult as needed  - Instruct patient on self management of diabetes  12/30/2023 1053 by Aviva Hubbard  Outcome: Progressing  12/30/2023 1051 by Aviva Hubbard  Outcome: Progressing     Problem: Patient/Family Goals  Goal: Patient/Family Long Term Goal  Description: Patient's Long Term Goal: Go back home     Interventions:  - Transition to oral lasix   - See additional Care Plan goals for specific interventions  12/30/2023 1053 by Aviva Hubbard  Outcome: Progressing  12/30/2023 1051 by Aviva Hubbard  Outcome: Progressing  Goal: Patient/Family Short Term Goal  Description: Patient's Short Term Goal: Feel less short of breath     Interventions:   - IV lasix  -Strict I and O   -Daily weight   - See additional Care Plan goals for  specific interventions  12/30/2023 1053 by Aviva Hubbard  Outcome: Progressing  12/30/2023 1051 by Aviva Hubbard  Outcome: Progressing

## 2023-12-30 NOTE — ED PROVIDER NOTES
Patient Seen in: [facility] Emergency Department    History     Chief Complaint   Patient presents with    Difficulty Breathing    Rash     Stated Complaint: Difficulty Breathing;Rash    Subjective:   Shae Mendez  is a 78 year old female with HTN, HLD, past hx pericarditis s/p pericardial window, atrial fibrillation, here for shortness of breath and family heard her wheezing today.  She just left [facility] 2 days ago.  She was admitted there electively for cardioversion of atrial fibrillation and ended up being admitted for about a week with acute kidney injury and required hydration.  Her new medications are amiodarone and she is on Eliquis.  She was also diagnosed with a fungal skin infection in her pannus fold.  The rash looks really bad to the family last night but they put a lot of powder on it today and it does look a little better.  She said she does have orthopnea.  Her legs have been swollen now for a while.  They are concerned because she did get a lot of fluid at [facility] as well.  She does not have cough or fever or confusion.  No vomiting or diarrhea.  No dysuria.  They went to an urgent care and her oxygen saturation was low at 91% and they wanted to send her here by ambulance.  The daughter says she saw her sats between 91 and 95 while at the other hospital.            Objective:   Past Medical History:   Diagnosis Date    Anxiety state, unspecified     Breast cancer (HCC) 09/09/2020    LEFT breast (invasive mammary carcinoma)    Choroidal nevus of right eye 7/10/2017    Chronic vulvitis 11/20/2018    Combined forms of age-related cataract of both eyes 7/10/2017    Diverticulitis of colon (without mention of hemorrhage)(562.11)     Esophageal reflux     Gallstones 4/14/2016    High blood pressure     High cholesterol     IBS (irritable bowel syndrome)     Obesity, unspecified     Other and unspecified hyperlipidemia     Pericarditis 2000    Personal history of colonic polyps 2008     Ptosis of both eyelids 2018    SNHL (sensorineural hearing loss) 3/19/2016    Tinnitus, bilateral 3/19/2016    Type II or unspecified type diabetes mellitus without mention of complication, not stated as uncontrolled     Unspecified essential hypertension     Unspecified sleep apnea               Past Surgical History:   Procedure Laterality Date    BREAST LUMPECTOMY Left 10/30/2020    Dr. Mosley      1970,     CATARACTS, OPHTHM ([facility])      BILATERAL    COLECTOMY      COLONOSCOPY N/A 3/23/2022    Procedure: Colonoscopy with cold snare polypectomy and placement of 3 [employer] Resolution 360 hemostatic clips.;  Surgeon: Fidencio Crockett MD;  Location: [facility], [facility]    HERNIA SURGERY  8/20/15    [facility] by Dr. Jackson,    LUMPECTOMY LEFT  10/2020    invasive mammary carcinoma     OTHER SURGICAL HISTORY      oophorectomy    OTHER SURGICAL HISTORY      cardio window    OTHER SURGICAL HISTORY      lap low anterior resection    RADIATION LEFT      [facility]    TOTAL KNEE REPLACEMENT Right                 Social History     Socioeconomic History    Marital status:    Tobacco Use    Smoking status: Former     Packs/day: 1.00     Years: 20.00     Additional pack years: 0.00     Total pack years: 20.00     Types: Cigarettes     Quit date: 3/7/1993     Years since quittin.8    Smokeless tobacco: Never   Vaping Use    Vaping Use: Never used   Substance and Sexual Activity    Alcohol use: Yes     Comment: social    Drug use: No    Sexual activity: Not Currently     Social Determinants of Health     Food Insecurity: No Food Insecurity (2023)    Food Insecurity     Food Insecurity: Never true   Transportation Needs: No Transportation Needs (2023)    Transportation Needs     Lack of Transportation: No   Housing Stability: Low Risk  (2023)    Housing Stability     Housing Instability: No              Review of Systems    Positive for stated complaint:  Difficulty Breathing;Rash  Other systems are as noted in HPI.  Constitutional and vital signs reviewed.      All other systems reviewed and negative except as noted above.    Physical Exam     ED Triage Vitals [12/29/23 1910]   /69   Pulse 83   Resp 22   Temp 97.5 °F (36.4 °C)   Temp src Temporal   SpO2 94 %   O2 Device None (Room air)       Current:/84 (BP Location: Right arm)   Pulse 75   Temp 97.5 °F (36.4 °C) (Temporal)   Resp 20   SpO2 90%         Physical Exam  Constitutional:       Appearance: She is obese.   HENT:      Head: Normocephalic.      Mouth/Throat:      Mouth: Mucous membranes are moist.   Eyes:      Pupils: Pupils are equal, round, and reactive to light.   Cardiovascular:      Rate and Rhythm: Regular rhythm.   Pulmonary:      Effort: Pulmonary effort is normal.      Comments: Diminished at the bases with a few rales  Abdominal:      Palpations: Abdomen is soft.      Tenderness: There is no abdominal tenderness.   Musculoskeletal:      Cervical back: Normal range of motion and neck supple.      Right lower leg: Edema present.      Left lower leg: Edema present.   Skin:     General: Skin is warm.      Capillary Refill: Capillary refill takes less than 2 seconds.      Comments: Erythema and dryness in abdominal pannus folds consistent with intertrigo.   Neurological:      Mental Status: She is alert.      Sensory: No sensory deficit.      Motor: No weakness.               ED Course     Labs Reviewed   COMP METABOLIC PANEL (14) - Abnormal; Notable for the following components:       Result Value    Glucose 158 (*)     Potassium 3.3 (*)     CO2 34.0 (*)     Creatinine 1.21 (*)     eGFR-Cr 45 (*)     ALT 69 (*)     Bilirubin, Total 1.4 (*)     All other components within normal limits   BNP (B TYPE NATRIURETIC PEPTIDE) - Abnormal; Notable for the following components:    Beta Natriuretic Peptide 1,205 (*)     All other components within normal limits   PROTHROMBIN TIME (PT) - Abnormal;  Notable for the following components:    PT 18.3 (*)     INR 1.43 (*)     All other components within normal limits   TROPONIN I HIGH SENSITIVITY - Normal   PTT, ACTIVATED - Normal   CBC WITH DIFFERENTIAL WITH PLATELET    Narrative:     The following orders were created for panel order CBC With Differential With Platelet.  Procedure                               Abnormality         Status                     ---------                               -----------         ------                     CBC W/ DIFFERENTIAL[[id number]]                              Final result                 Please view results for these tests on the individual orders.   COMP METABOLIC PANEL (14)   MAGNESIUM   CBC WITH DIFFERENTIAL WITH PLATELET   HEMOGLOBIN A1C   RAINBOW DRAW BLUE   RAINBOW DRAW GOLD   CBC W/ DIFFERENTIAL     EKG    Rate, intervals and axes as noted on EKG Report.  Rate: 75  Rhythm: Sinus Rhythm  Reading: Sinus rhythm with PACs, right axis, poor anterior R wave progression.  Abnormal EKG read by myself.  QTc 469.              ED Course as of 12/30/23 0019  ------------------------------------------------------------  Time: 12/29 2217  Comment: Discussed with Dr Robles from cards as well as Chencho from Hospitalist group will admit.  Labs independently to  ------------------------------------------------------------  Time: 12/29 2218  Comment: Labs independently interpreted by me Trop wnl, CBC unremarkable.  Potassium low.  proBNP high at 1200.              MDM      XR CHEST AP PORTABLE  (CPT=71045)    Result Date: 12/29/2023  CONCLUSION:   Small to moderate left pleural effusion with hazy left basilar opacities, which could relate to compressive atelectasis although coexistent infection/pneumonia should be excluded on the basis of clinical parameters.  Effusion may be on the basis of congestive failure as there is background cardiomegaly, pulmonary vascular congestion, and basilar interstitial edema.  Radiographic follow-up  to resolution is advised.   Dictated by (CST): Zain Hinds MD on 12/29/2023 at 8:26 PM     Finalized by (CST): Zain Hinds MD on 12/29/2023 at 8:28 PM           Admission disposition: 12/29/2023 10:20 PM                                        Medical Decision Making  Patient with shortness of breath decreased breath sounds and borderline saturation.  Differential could include but is not limited to fluid overload, pleural effusion, PE, pneumonia, ACS.  Rash does seem to be fungal in nature but I am considering bacterial as well.    Amount and/or Complexity of Data Reviewed  External Data Reviewed: notes.     Details: I reviewed [facility] notes in the discharge summary    Recent echo showed an EF of 40 to 45%.  This was earlier in the month  Labs: ordered. Decision-making details documented in ED Course.  Radiology: ordered and independent interpretation performed. Decision-making details documented in ED Course.  ECG/medicine tests: ordered and independent interpretation performed. Decision-making details documented in ED Course.  Discussion of management or test interpretation with external provider(s): Patient given Lasix and oxygen.  Discussed with admitting physician as well as cardiology    Risk  Decision regarding hospitalization.        Disposition and Plan     Clinical Impression:  1. Acute pulmonary edema (HCC)    2. Pleural effusion    3. Respiratory insufficiency    4. Hypokalemia         Disposition:  Admit  12/29/2023 10:20 pm    Follow-up:  Fahad Erickson,   [address] [facility] [id number] [phone]    Follow up      We recommend that you schedule follow up care with a primary care provider within the next three months to obtain basic health screening including reassessment of your blood pressure.      Medications Prescribed:  Current Discharge Medication List                            Hospital Problems       Present on Admission  Date Reviewed: 1/27/2022             ICD-10-CM Noted POA    * (Principal) Acute pulmonary edema (HCC) J81.0 12/29/2023 Unknown    Hyperglycemia R73.9 12/29/2023 Yes    Hypokalemia E87.6 12/29/2023 Yes    Metabolic alkalosis E87.3 12/29/2023 Yes    Pleural effusion J90 12/29/2023 Unknown    Respiratory insufficiency R06.89 12/29/2023 Unknown

## 2023-12-30 NOTE — H&P
[facility] Hospitalist H&P       CC:   Chief Complaint   Patient presents with    Difficulty Breathing    Rash        PCP: Fahad Erickson DO    History of Present Illness:   Ms Mendez is an 78 female with hs of HTN, HLD, type 2 DM,  hs of paricarditis (s/p pericardial window), Afib on eliquis s/p cardioversion on 12/20, who presents with sob, and abdominal wall redness.  Patient states she was just discharged from [facility] on 12/27, she was admitted there after a cardioversion and was noted to have CYNDI, also noted to have a fungal skin infection underneath her pannus.  She was noted to have some slight progression of the erythema underneath her abdominal folds yesterday, and also was slightly more short of breath according to the family, so was taken to the immediate care center and sent to the ER.  Her chest showed some pulmonary edema, she did note some orthopnea and persistent lower extremity edema as well, so she was admitted for further management, she denies any chest pain, no nausea or vomiting, no fever or chills, no headaches or vision changes.        PMH  Past Medical History:   Diagnosis Date    Anxiety state, unspecified     Breast cancer (HCC) 09/09/2020    LEFT breast (invasive mammary carcinoma)    Choroidal nevus of right eye 7/10/2017    Chronic vulvitis 11/20/2018    Combined forms of age-related cataract of both eyes 7/10/2017    Diverticulitis of colon (without mention of hemorrhage)(562.11)     Esophageal reflux     Gallstones 4/14/2016    High blood pressure     High cholesterol     IBS (irritable bowel syndrome)     Obesity, unspecified     Other and unspecified hyperlipidemia     Pericarditis 2000    Personal history of colonic polyps 2008    Ptosis of both eyelids 8/9/2018    SNHL (sensorineural hearing loss) 3/19/2016    Tinnitus, bilateral 3/19/2016    Type II or unspecified type diabetes mellitus without mention of complication, not stated as uncontrolled      Unspecified essential hypertension     Unspecified sleep apnea         PSH  Past Surgical History:   Procedure Laterality Date    BREAST LUMPECTOMY Left 10/30/2020    Dr. Mosley      1970,     CATARACTS, OPHTHM ([facility] [place])      BILATERAL    COLECTOMY      COLONOSCOPY N/A 3/23/2022    Procedure: Colonoscopy with cold snare polypectomy and placement of 3 [place] [employer] Resolution 360 hemostatic clips.;  Surgeon: Fidencio Crockett MD;  Location: [facility] [place] [facility], [facility]    HERNIA SURGERY  8/20/15    [facility] by Dr. Jackson,    LUMPECTOMY LEFT  10/2020    invasive mammary carcinoma     OTHER SURGICAL HISTORY      oophorectomy    OTHER SURGICAL HISTORY      cardio window    OTHER SURGICAL HISTORY      lap low anterior resection    RADIATION LEFT      [facility]    TOTAL KNEE REPLACEMENT Right         ALL:  Allergies   Allergen Reactions    Erythromycin     Penicillins     Sulfa Antibiotics         Home Medications:  Outpatient Medications Marked as Taking for the 23 encounter (Hospital Encounter)   Medication Sig Dispense Refill    amiodarone 200 MG Oral Tab Take 2 tablets (400 mg total) by mouth every 12 (twelve) hours.      apixaban 5 MG Oral Tab Take 1 tablet (5 mg total) by mouth 2 (two) times daily.      clotrimazole-betamethasone 1-0.05 % External Cream Apply 1 Application topically 2 (two) times daily.      furosemide 20 MG Oral Tab Take 1 tablet (20 mg total) by mouth daily.      Nystatin 097787 UNIT/GM External Powder Apply 1 Application topically 3 (three) times daily.      Pantoprazole Sodium (PROTONIX OR) Take 40 mg by mouth daily.      metFORMIN  MG Oral Tablet 24 Hr Take 1 tablet (500 mg total) by mouth daily with breakfast. 90 tablet 1    atorvastatin 40 MG Oral Tab Take 1 tablet (40 mg total) by mouth daily. 90 tablet 3    Olmesartan Medoxomil 40 MG Oral Tab Take 1 tablet (40 mg total) by mouth daily. 90 tablet 3    Sertraline HCl 25 MG Oral Tab Take 1 tablet (25 mg total)  by mouth daily. (Patient taking differently: Take 2 tablets (50 mg total) by mouth daily.) 90 tablet 1    Fluticasone Propionate (FLONASE) 50 MCG/ACT Nasal Suspension USE TWO SPRAY(S) IN EACH NOSTRIL ONCE DAILY 3 Inhaler 5         Soc Hx  Social History     Tobacco Use    Smoking status: Former     Packs/day: 1.00     Years: 20.00     Additional pack years: 0.00     Total pack years: 20.00     Types: Cigarettes     Quit date: 3/7/1993     Years since quittin.8    Smokeless tobacco: Never   Substance Use Topics    Alcohol use: Yes     Comment: social        Fam Hx  Family History   Problem Relation Age of Onset    Diabetes Other     Other (Other) Other     Other (LEILA) Brother     Other (Other) Mother         embolism       Review of Systems  Comprehensive ROS reviewed and negative except for what's stated above.     OBJECTIVE:  /57 (BP Location: Right arm)   Pulse 95   Temp 98.6 °F (37 °C) (Oral)   Resp 19   Ht 5' 2\" (1.575 m)   Wt 231 lb 14.4 oz (105.2 kg)   SpO2 94%   BMI 42.42 kg/m²   General:  Alert, no distress   Head:  Normocephalic, without obvious abnormality, atraumatic.   Eyes:  Sclera anicteric, No conjunctival pallor    Nose: Nares normal. Septum midline. Mucosa normal. No drainage.   Throat: Lips, mucosa, and tongue normal. Teeth and gums normal.   Neck: Supple,     Lungs:   C crackles noted in the lower lung base   Chest wall:  No tenderness or deformity.   Heart:  Regular rate and rhythm, S1, S2 normal, no murmur, rub or gallop appreciated   Abdomen:   Soft, non-tender. Bowel sounds normal. No masses, erythema underneath abdominal pannus   Extremities: Extremities normal, atraumatic, no cyanosis or edema.   Skin: Erythema underneath abdominal pannus   Neurologic: Normal strength, no focal deficit appreciated     Diagnostic Data:    CBC/Chem  Recent Labs   Lab 23  2104 23  0635   WBC 7.3 6.3   HGB 14.1 12.6   MCV 89.8 89.5   .0 244.0   INR 1.43*  --        Recent  Labs   Lab 12/29/23 2104      K 3.3*      CO2 34.0*   BUN 16   CREATSERUM 1.21*   *   CA 9.4       Recent Labs   Lab 12/29/23 2104   ALT 69*   AST 29   ALB 3.9       No results for input(s): \"TROP\" in the last 168 hours.       Radiology: XR CHEST AP PORTABLE  (CPT=71045)    Result Date: 12/29/2023  CONCLUSION:   Small to moderate left pleural effusion with hazy left basilar opacities, which could relate to compressive atelectasis although coexistent infection/pneumonia should be excluded on the basis of clinical parameters.  Effusion may be on the basis of congestive failure as there is background cardiomegaly, pulmonary vascular congestion, and basilar interstitial edema.  Radiographic follow-up to resolution is advised.   Dictated by (CST): Zain Hinds MD on 12/29/2023 at 8:26 PM     Finalized by (CST): Zain Hinds MD on 12/29/2023 at 8:28 PM             ASSESSMENT / PLAN:   Ms Mendez is an 78 female with hs of HTN, HLD, type 2 DM,  hs of paricarditis (s/p pericardial window), Afib on eliquis s/p cardioversion on 12/20, who presents with sob, and abdominal wall redness.    Acute on chronic systolic CHF  -Echo at [facility] on 12/18 with a EF of 45 to 50%  -BNP of 1200, troponin negative, chest x-ray with moderate pleural effusion  -IV Lasix 40 twice daily  -Hold ARB given recent CYNDI, do not want to precipitate renal failure  -Monitor I's and O's closely  -Cardiology consulted    Atrial Fibrillation with RVR  - s/p MATTHEW/ DCCV x 2 last week at [facility]  - Continue oral amnio load  -Continue metoprolol, continue Eliquis  - cards following  -monitor on telemetry    HTN   -Hold ARB    HLD  -Resume statin    Abnormal LFTs  -Are elevated at [facility], appear improved here  -Outpatient follow-up    T2DM  - hold oral meds  - SSI    Fungal Dermatitis  - in skin folds  - prescribed econazole powder    FN:  - IVF: None  - Diet: Advance    DVT Prophy: Eliquis  Lines: PIV    Dispo:  pending clinical course    Attempted to call daughter to update but no answer    Outpatient records or previous hospital records reviewed.     Further recommendations pending patient's clinical course.  [facility] hospitalist to continue to follow patient while in house    Patient and/or patient's family given opportunity to ask questions and note understanding and agreeing with therapeutic plan as outlined    Thank You,  David Mcdowell MD    Hospitalist with [facility] Service number: [phone]

## 2023-12-30 NOTE — PHYSICAL THERAPY NOTE
PHYSICAL THERAPY EVALUATION - INPATIENT     Room Number: COF15/COF15-A  Evaluation Date: 12/30/2023  Type of Evaluation: Initial   Physician Order: PT Eval and Treat    Presenting Problem: acute pulmonary edema     Reason for Therapy: Mobility Dysfunction and Discharge Planning    PHYSICAL THERAPY ASSESSMENT     Patient is a 78 year old female admitted 12/29/2023 for acute pulmonary edema.  Patient's current functional deficits include impaired bed mobility, transfers, ambulation and stair negotiation which are below the patient's pre-admission status.  Patient received sitting EOB. RN approved activity. Coordinated session with OT. Therapist educated pt on POC and physiological benefits of mobilization. Cues to promote pacing, pursed lipped breathing and energy conservation techniques. Patient agreeable to participate. Primary complaint is bilateral LE pain from swelling. +2 edema in bilateral LE's with redness.   Transfers: SBA with use of Rollator. Cues for pacing upon standing to allow body time to acclimate to change in position.   Ambulation: CG with use of RW initially and with turns. Improved to SBA as distance increased. Decreased soto speed. Decreased heel>toe bilateral LE's. Cues for upright posture 2* anterior lean. Noted dyspnea with exertion.   *SpO2 on 2L/min supplemental O2 via NC at rest: 96%. Room air trial for mobility with SPO2 at 86%. Patient requiring approx 1 minute seated rest break for SpO2>90% and returned 2L/min O2.     Patient in chair at end of session with needs in reach.     The patient's Approx Degree of Impairment: 41.77% has been calculated based on documentation in the [facility] '6 clicks' Inpatient Basic Mobility Short Form.  Research supports that patients with this level of impairment may benefit from home with home PT and initial 24 hour supervision/care.    Patient will benefit from continued IP PT services to address these deficits in preparation for discharge.    DISCHARGE  RECOMMENDATIONS  PT Discharge Recommendations: Home with home health PT;24 hour care/supervision    PLAN  PT Treatment Plan: Bed mobility;Body mechanics;Endurance;Energy conservation;Patient education;Family education;Gait training;Range of motion;Strengthening;Stoop training;Stair training;Transfer training;Balance training  Rehab Potential : Good  Frequency (Obs): 5x/week       PHYSICAL THERAPY MEDICAL/SOCIAL HISTORY   Problem List  Principal Problem:    Acute pulmonary edema (HCC)  Active Problems:    Hyperglycemia    Hypokalemia    Metabolic alkalosis    Pleural effusion    Respiratory insufficiency      HOME SITUATION  Home Situation  Type of Home: House  Home Layout: Two level  Stairs to Enter : 0  Stairs to Bedroom: 13  Railing: Yes  Lives With: Alone  Drives: Yes  Patient Owned Equipment: Rollator  Patient Regularly Uses: Hearing aides;Reading glasses     Prior Level of [place]: Ind in ADL's and IADL's. +driving. Plans to move to [place] w dtr in the next few months.     SUBJECTIVE  \"I'm happy to be at this hospital\"     PHYSICAL THERAPY EXAMINATION     OBJECTIVE  Precautions: Cardiac  Fall Risk: Standard fall risk    WEIGHT BEARING RESTRICTION  Weight Bearing Restriction: None                PAIN ASSESSMENT  Rating: Unable to rate  Location: bilateral LE's from swelling  Management Techniques: Activity promotion;Body mechanics;Repositioning    COGNITION  WFL    RANGE OF MOTION AND STRENGTH ASSESSMENT  Upper extremity ROM and strength are within functional limits   Lower extremity ROM is within functional limits   Lower extremity strength is within functional limits     BALANCE  Static Sitting: Normal  Dynamic Sitting: Good  Static Standing: Fair +  Dynamic Standing: Fair    ADDITIONAL TESTS      Edema: Moderate pitting, indentation subsides rapidly  Edema Location: bilateral LE's + redness    ACTIVITY TOLERANCE  Pulse: 84  Heart Rate Source: Monitor        O2 WALK  Oxygen Therapy  SPO2% on Oxygen at  Rest: 96  At rest oxygen flow (liters per minute): 2  SPO2% Ambulation on Room Air: 86  SPO2% Ambulation on Oxygen: 96  Ambulation oxygen flow (liters per minute): 2    AM-PAC '6-Clicks' INPATIENT SHORT FORM - BASIC MOBILITY  How much difficulty does the patient currently have...  Patient Difficulty: Turning over in bed (including adjusting bedclothes, sheets and blankets)?: None   Patient Difficulty: Sitting down on and standing up from a chair with arms (e.g., wheelchair, bedside commode, etc.): A Little   Patient Difficulty: Moving from lying on back to sitting on the side of the bed?: A Little   How much help from another person does the patient currently need...   Help from Another: Moving to and from a bed to a chair (including a wheelchair)?: A Little   Help from Another: Need to walk in hospital room?: A Little   Help from Another: Climbing 3-5 steps with a railing?: A Little     AM-PAC Score:  Raw Score: 19   Approx Degree of Impairment: 41.77%   Standardized Score (AM-PAC Scale): 45.44   CMS Modifier (G-Code): CK    FUNCTIONAL ABILITY STATUS  Functional Mobility/Gait Assessment  Gait Assistance: Contact guard assist (SBA)  Distance (ft): 100ft  Assistive Device:  (Rollator)  Pattern:  (forward flexed posture, decreased soto speed, decreased heel<>toe bilateral LE's)      Exercise/Education Provided:  Body mechanics  Energy conservation  Functional activity tolerated  Gait training  Posture  Strengthening  Transfer training    Patient End of Session: Up in chair;Needs met;Call light within reach;RN aware of session/findings    CURRENT GOALS    Goals to be met by: 1/6/24  Patient Goal Patient's self-stated goal is: return to PLOF   Goal #1 Patient is able to demonstrate supine - sit EOB @ level: independent     Goal #1   Current Status    Goal #2 Patient is able to demonstrate transfers Sit to/from Stand at assistance level: modified independent with rollator     Goal #2  Current Status    Goal #3 Patient  is able to ambulate 300 feet with assist device: rollator at assistance level: modified independent with SpO2 >90% with activity   Goal #3   Current Status    Goal #4 Patient will negotiate 13 stairs/one curb w/ assistive device and supervision with SpO2>90% with activity   Goal #4   Current Status    Goal #5 Patient to demonstrate independence with home activity/exercise instructions provided to patient in preparation for discharge.   Goal #5   Current Status      Patient Evaluation Complexity Level:  History Low - no personal factors and/or co-morbidities   Examination of body systems Low - addressing 1-2 elements   Clinical Presentation Low - Stable   Clinical Decision Making Low Complexity       Gait Training: 10 minutes  Therapeutic Activity: 5 minutes

## 2023-12-31 LAB
ANION GAP SERPL CALC-SCNC: <0 MMOL/L (ref 0–18)
BUN BLD-MCNC: 14 MG/DL (ref 9–23)
BUN/CREAT SERPL: 14.9 (ref 10–20)
CALCIUM BLD-MCNC: 8.9 MG/DL (ref 8.7–10.4)
CHLORIDE SERPL-SCNC: 101 MMOL/L (ref 98–112)
CO2 SERPL-SCNC: 34 MMOL/L (ref 21–32)
CREAT BLD-MCNC: 0.94 MG/DL
EGFRCR SERPLBLD CKD-EPI 2021: 61 ML/MIN/1.73M2 (ref 60–?)
GLUCOSE BLD-MCNC: 125 MG/DL (ref 70–99)
GLUCOSE BLDC GLUCOMTR-MCNC: 133 MG/DL (ref 70–99)
GLUCOSE BLDC GLUCOMTR-MCNC: 139 MG/DL (ref 70–99)
GLUCOSE BLDC GLUCOMTR-MCNC: 167 MG/DL (ref 70–99)
GLUCOSE BLDC GLUCOMTR-MCNC: 215 MG/DL (ref 70–99)
MAGNESIUM SERPL-MCNC: 1.5 MG/DL (ref 1.6–2.6)
OSMOLALITY SERPL CALC.SUM OF ELEC: 280 MOSM/KG (ref 275–295)
POTASSIUM SERPL-SCNC: 3.5 MMOL/L (ref 3.5–5.1)
POTASSIUM SERPL-SCNC: 3.5 MMOL/L (ref 3.5–5.1)
POTASSIUM SERPL-SCNC: 4.9 MMOL/L (ref 3.5–5.1)
SODIUM SERPL-SCNC: 134 MMOL/L (ref 136–145)

## 2023-12-31 PROCEDURE — 80048 BASIC METABOLIC PNL TOTAL CA: CPT | Performed by: HOSPITALIST

## 2023-12-31 PROCEDURE — 83735 ASSAY OF MAGNESIUM: CPT | Performed by: HOSPITALIST

## 2023-12-31 PROCEDURE — 82962 GLUCOSE BLOOD TEST: CPT

## 2023-12-31 PROCEDURE — 84132 ASSAY OF SERUM POTASSIUM: CPT | Performed by: HOSPITALIST

## 2023-12-31 RX ORDER — POTASSIUM CHLORIDE 20 MEQ/1
40 TABLET, EXTENDED RELEASE ORAL EVERY 4 HOURS
Status: COMPLETED | OUTPATIENT
Start: 2023-12-31 | End: 2023-12-31

## 2023-12-31 RX ORDER — MAGNESIUM OXIDE 400 MG/1
800 TABLET ORAL ONCE
Status: COMPLETED | OUTPATIENT
Start: 2023-12-31 | End: 2023-12-31

## 2023-12-31 NOTE — PLAN OF CARE
Problem: Patient Centered Care  Goal: Patient preferences are identified and integrated in the patient's plan of care  Description: Interventions:  - What would you like us to know as we care for you? From Home alone   - Provide timely, complete, and accurate information to patient/family  - Incorporate patient and family knowledge, values, beliefs, and cultural backgrounds into the planning and delivery of care  - Encourage patient/family to participate in care and decision-making at the level they choose  - Honor patient and family perspectives and choices  Outcome: Progressing     Problem: Diabetes/Glucose Control  Goal: Glucose maintained within prescribed range  Description: INTERVENTIONS:  - Monitor Blood Glucose as ordered  - Assess for signs and symptoms of hyperglycemia and hypoglycemia  - Administer ordered medications to maintain glucose within target range  - Assess barriers to adequate nutritional intake and initiate nutrition consult as needed  - Instruct patient on self management of diabetes  Outcome: Progressing     Problem: Patient/Family Goals  Goal: Patient/Family Long Term Goal  Description: Patient's Long Term Goal: Go back home     Interventions:  - Transition to oral lasix   - See additional Care Plan goals for specific interventions  Outcome: Progressing  Goal: Patient/Family Short Term Goal  Description: Patient's Short Term Goal: Feel less short of breath     Interventions:   - IV lasix  -Strict I and O   -Daily weight   - See additional Care Plan goals for specific interventions  Outcome: Progressing     Pt alert and oriented X 4. Pt on 1.5 L nasal cannula. No complaints throughout the day. Pt on IV lasix. Safety precautions in place, call light within reach. Plan is to continue diuresing.

## 2023-12-31 NOTE — PROGRESS NOTES
[facility] Hospitalist Progress Note     CC: Hospital Follow up    PCP: Fahad Erickson DO       Assessment/Plan:     Principal Problem:    Acute pulmonary edema (HCC)  Active Problems:    Hyperglycemia    Hypokalemia    Metabolic alkalosis    Pleural effusion    Respiratory insufficiency    Ms Mendez is an 78 female with hs of HTN, HLD, type 2 DM,  hs of paricarditis (s/p pericardial window), Afib on eliquis s/p cardioversion on 12/20, who presents with sob, and abdominal wall erythema.     Acute on chronic systolic CHF  -Echo at [facility] on 12/18 with a EF of 45 to 50%  -BNP of 1200, troponin negative, chest x-ray with moderate pleural effusion  -IV Lasix 40 twice daily  -Hold ARB given recent CYNDI, do not want to precipitate CYNDI  -Monitor I's and O's closely-> > 4 L yesterday  -Cardiology consulted  -still on O2, continue IV lasix, renal function stable     Atrial Fibrillation with RVR  - s/p MATTHEW/ DCCV x 2 last week at [facility]  - Continue oral amnio load  -Continue metoprolol, continue Eliquis  - cards following  -monitor on telemetry    HTN   -Hold ARB     HLD  -Resume statin    Abnormal LFTs  -Are elevated at [facility], appear improved here  -Outpatient follow-up    T2DM  - hold oral meds  - SSI    Fungal Dermatitis  - in skin folds  - prescribed miconazole powder  - improved     FN:  - IVF: None  - Diet: Advance     DVT Prophy: Eliquis  Lines: PIV     Dispo: pending clinical course    Discussed with daughter and Granddaughter at bedside    Questions/concerns were discussed with patient and/or family by bedside.    Thank You,  David Mcdowell MD    Hospitalist with [facility]     Subjective:     Feeling better, breathing improved, legs still swollen, no cp     OBJECTIVE:    Blood pressure 134/61, pulse 74, temperature 98 °F (36.7 °C), temperature source Oral, resp. rate 18, height 5' 2\" (1.575 m), weight 226 lb 9.6 oz (102.8 kg), SpO2 93%.    Temp:  [97 °F (36.1 °C)-98.3 °F  (36.8 °C)] 98 °F (36.7 °C)  Pulse:  [67-84] 74  Resp:  [18-19] 18  BP: (130-150)/(61-68) 134/61  SpO2:  [93 %-97 %] 93 %      Intake/Output:    Intake/Output Summary (Last 24 hours) at 12/31/2023 0833  Last data filed at 12/30/2023 1745  Gross per 24 hour   Intake --   Output 2400 ml   Net -2400 ml       Last 3 Weights   12/31/23 0316 226 lb 9.6 oz (102.8 kg)   12/30/23 0055 231 lb 14.4 oz (105.2 kg)   03/23/22 1025 223 lb (101.2 kg)   03/21/22 1352 225 lb (102.1 kg)   01/27/22 1111 225 lb (102.1 kg)       Exam   Gen: No acute distress, alert and oriented x3   Heent: NC AT, neck supple  Pulm: Lungs clear, normal respiratory effort  CV: Heart with regular rate and rhythm, + peripheral edema   Abd: Abdomen soft, nontender, nondistended   MSK: no clubbing, no cyanosis  Skin: no rashes or lesions  Neuro: AO*3, motor intact, no sensory deficits  Psyc: appropriate mood and affect      Data Review:       Labs:     Recent Labs   Lab 12/29/23 2104 12/30/23  0635   RBC 5.00 4.48   HGB 14.1 12.6   HCT 44.9 40.1   MCV 89.8 89.5   MCH 28.2 28.1   MCHC 31.4 31.4   RDW 14.0 13.8   NEPRELIM 4.79 3.59   WBC 7.3 6.3   .0 244.0         Recent Labs   Lab 12/29/23 2104 12/30/23  0635 12/31/23  0729   * 124* 125*   BUN 16 13 14   CREATSERUM 1.21* 0.94 0.94   EGFRCR 45* 61 61   CA 9.4 8.9 8.9    142 134*   K 3.3* 3.7 3.5  3.5    103 101   CO2 34.0* 34.0* 34.0*       Recent Labs   Lab 12/29/23 2104 12/30/23  0635   ALT 69* 53*   AST 29 25   ALB 3.9 3.5         Imaging:  XR CHEST AP PORTABLE  (CPT=71045)    Result Date: 12/29/2023  CONCLUSION:   Small to moderate left pleural effusion with hazy left basilar opacities, which could relate to compressive atelectasis although coexistent infection/pneumonia should be excluded on the basis of clinical parameters.  Effusion may be on the basis of congestive failure as there is background cardiomegaly, pulmonary vascular congestion, and basilar interstitial edema.   Radiographic follow-up to resolution is advised.   Dictated by (CST): Zain Hinds MD on 12/29/2023 at 8:26 PM     Finalized by (CST): Zain Hinds MD on 12/29/2023 at 8:28 PM             Meds:      magnesium oxide  800 mg Oral Once    potassium chloride  40 mEq Oral Q4H    amiodarone  400 mg Oral q12h    apixaban  5 mg Oral BID    miconazole   Topical BID    sertraline  50 mg Oral Daily    furosemide  40 mg Intravenous BID (Diuretic)    insulin aspart  1-5 Units Subcutaneous TID CC and HS    atorvastatin  40 mg Oral Daily       glucose **OR** glucose **OR** glucose-vitamin C **OR** dextrose **OR** glucose **OR** glucose **OR** glucose-vitamin C, acetaminophen, melatonin, polyethylene glycol (PEG 3350), sennosides, bisacodyl, fleet enema, ondansetron, metoclopramide, influenza virus vaccine PF

## 2023-12-31 NOTE — PROGRESS NOTES
[facility]    Cardiology Progress Note    Shae Mendez Patient Status:  Inpatient    1945 MRN [id number]   Location [facility] 3W/SW Attending David Mcdowell MD   Hosp Day # 2 PCP Fahad Erickson, DO       Assessment/Plan  Briefly, patient is a 79 yo woman with HTN, HDL, T2DM, hx of pericarditis s/p pericardial window, afib on eliquis s/p DCCV  who presented with symptoms of CHF.  She is diuresing well on IV lasix 40mg daily.    Acute decompensated CHF; LVEF mildly reduced at 45-50%.  Paroxysmal atrial fibrillation, now in NSR    PLAN  -- IV lasix 40mg BID through today and re-assess tomorrow. Her legs appear to be swollen past baseline still.  -- BMP daily, strict I/O  -- Continue amio 400mg BID  -- Continue Eliquis 5mg BID  -- Compression stockings ordered     Subjective:   Has had good UOP though not documented.  She still has some swelling in her legs past baseline per her daughter. Cr 0.94 today.    Patient Active Problem List   Diagnosis    Hypertension    Hyperlipidemia    Obesity    Diabetes mellitus type 2 without retinopathy (HCC)    OAB (overactive bladder)    Spondylolisthesis, grade 1    Primary osteoarthritis of right hip    Acute pulmonary edema (HCC)    Hyperglycemia    Hypokalemia    Metabolic alkalosis    Pleural effusion    Respiratory insufficiency       Objective:   Temp: 97.7 °F (36.5 °C)  Pulse: 74  Resp: 18  BP: 131/52    Intake/Output:     Intake/Output Summary (Last 24 hours) at 2023 1400  Last data filed at 2023 1003  Gross per 24 hour   Intake 10 ml   Output 800 ml   Net -790 ml       Last 3 Weights   23 0316 226 lb 9.6 oz (102.8 kg)   23 0055 231 lb 14.4 oz (105.2 kg)   22 1025 223 lb (101.2 kg)   22 1352 225 lb (102.1 kg)   22 1111 225 lb (102.1 kg)       Tele: NSR    Physical Exam:    General: Alert and oriented x 3. No apparent distress. No respiratory or constitutional distress.  HEENT: Normocephalic,  anicteric sclera, neck supple, no thyromegaly or adenopathy.  Neck: No JVD, carotids 2+, no bruits.  Cardiac: Regular rate and rhythm. S1, S2 normal. No murmur, pericardial rub, S3, thrill, heave or extra cardiac sounds.  Lungs: Clear without wheezes, rales, rhonchi or dullness.  Normal excursions and effort.  Abdomen: Soft, non-tender. No organosplenomegally, mass or rebound, BS-present.  Extremities: Without clubbing, cyanosis.  Peripheral pulses are 2+. +2 edema to thigh.  Neurologic: Alert and oriented, normal affect. No motor or coordinational deficit.  Skin: Warm and dry.     Laboratory/Data:    Labs:         Recent Labs   Lab 12/29/23 2104 12/30/23  0635   WBC 7.3 6.3   HGB 14.1 12.6   MCV 89.8 89.5   .0 244.0   INR 1.43*  --        Recent Labs   Lab 12/29/23 2104 12/30/23  0635 12/31/23  0729    142 134*   K 3.3* 3.7 3.5  3.5    103 101   CO2 34.0* 34.0* 34.0*   BUN 16 13 14   CREATSERUM 1.21* 0.94 0.94   CA 9.4 8.9 8.9   MG  --  1.4* 1.5*   * 124* 125*       Recent Labs   Lab 12/29/23 2104 12/30/23  0635   ALT 69* 53*   AST 29 25   ALB 3.9 3.5       No results for input(s): \"TROP\" in the last 168 hours.    Allergies:   Allergies   Allergen Reactions    Erythromycin     Penicillins     Sulfa Antibiotics        Medications:  Current Facility-Administered Medications   Medication Dose Route Frequency    amiodarone (Pacerone) tab 400 mg  400 mg Oral q12h    apixaban (Eliquis) tab 5 mg  5 mg Oral BID    miconazole 2 % powder   Topical BID    sertraline (Zoloft) tab 50 mg  50 mg Oral Daily    furosemide (Lasix) 10 mg/mL injection 40 mg  40 mg Intravenous BID (Diuretic)    insulin aspart (NovoLOG) 100 Units/mL FlexPen 1-5 Units  1-5 Units Subcutaneous TID CC and HS    atorvastatin (Lipitor) tab 40 mg  40 mg Oral Daily    glucose (Dex4) 15 GM/59ML oral liquid 15 g  15 g Oral Q15 Min PRN    Or    glucose (Glutose) 40% oral gel 15 g  15 g Oral Q15 Min PRN    Or    glucose-vitamin C  (Dex-4) chewable tab 4 tablet  4 tablet Oral Q15 Min PRN    Or    dextrose 50% injection 50 mL  50 mL Intravenous Q15 Min PRN    Or    glucose (Dex4) 15 GM/59ML oral liquid 30 g  30 g Oral Q15 Min PRN    Or    glucose (Glutose) 40% oral gel 30 g  30 g Oral Q15 Min PRN    Or    glucose-vitamin C (Dex-4) chewable tab 8 tablet  8 tablet Oral Q15 Min PRN    acetaminophen (Tylenol Extra Strength) tab 500 mg  500 mg Oral Q4H PRN    melatonin tab 3 mg  3 mg Oral Nightly PRN    polyethylene glycol (PEG 3350) (Miralax) 17 g oral packet 17 g  17 g Oral Daily PRN    sennosides (Senokot) tab 17.2 mg  17.2 mg Oral Nightly PRN    bisacodyl (Dulcolax) 10 MG rectal suppository 10 mg  10 mg Rectal Daily PRN    fleet enema (Fleet) 7-19 GM/118ML rectal enema 133 mL  1 enema Rectal Once PRN    ondansetron (Zofran) 4 MG/2ML injection 4 mg  4 mg Intravenous Q6H PRN    metoclopramide (Reglan) 5 mg/mL injection 5 mg  5 mg Intravenous Q8H PRN    influenza vaccine high dose quad (Fluzone QIV HD) 0.7 mL IM injection (ages >/= 65 years) 0.7 mL  0.7 mL Intramuscular Prior to discharge       Derik Arguelles MD  12/31/2023  2:00 PM

## 2023-12-31 NOTE — CM/SW NOTE
12/31/23 1000   CM/SW Referral Data   Referral Source Social Work (self-referral)   Reason for Referral Discharge planning   Informant Daughter   Medical Hx   Does patient have an established PCP? Yes  (Fahad Dre)   Patient Info   Patient's Home Environment House   Number of Levels in Home 3   Number of Stair in Home 12 each level   Patient lives with Alone   Patient Status Prior to Admission   Independent with ADLs and Mobility No   Pt. requires assistance with Ambulating   Services in place prior to admission DME/Supplies at home   Type of DME/Supplies Standard Walker   Discharge Needs   Anticipated D/C needs Home health care   Choice of Post-Acute Provider   Informed patient of right to choose their preferred provider Yes   List of appropriate post-acute services provided to patient/family with quality data Yes   Patient/family choice Pending   Information given to Daughter   Residential HHC/Hospice financial disclosure given Yes     SW spoke to pt's dtr Lilia via pt's room phone as F/up for DC Planning and HH list. Above assessment completed.    Pt lives alone in a home w/ 3 levels. There are approx 12 steps between each level.    Pt uses a walker for ambulation. Pt also drives every day. Per dtr, pt is currently doing Outpatient PT.    Lilia would like to discuss HH w/ pt when she is awake and confirm pt's decision. HH list of quality data emailed to pt's dtr at: [email or web address].    Dtr inquired about CG's. EDUARDO encouraged dtr to contact pt's BCBS plan to see if they offer services. SW also confirmed CG's would likely be OOP cost. EDUARDO emailed list of private duty CG's to pt's dtr as well.    Pt's dtr confirmed plan for pt to move to [place] w/ her January 29th. Pt will be moving into a single level senior residence in [place].    PLAN: Home w/ HH - pending final decision, choice, & med clear      SW/CM to remain available for support and/or discharge planning.         Dotty Voss, MSW, LSW [phone]

## 2024-01-01 LAB
ANION GAP SERPL CALC-SCNC: 1 MMOL/L (ref 0–18)
BUN BLD-MCNC: 14 MG/DL (ref 9–23)
BUN/CREAT SERPL: 14.7 (ref 10–20)
CALCIUM BLD-MCNC: 9 MG/DL (ref 8.7–10.4)
CHLORIDE SERPL-SCNC: 101 MMOL/L (ref 98–112)
CO2 SERPL-SCNC: 36 MMOL/L (ref 21–32)
CREAT BLD-MCNC: 0.95 MG/DL
EGFRCR SERPLBLD CKD-EPI 2021: 61 ML/MIN/1.73M2 (ref 60–?)
GLUCOSE BLD-MCNC: 129 MG/DL (ref 70–99)
GLUCOSE BLDC GLUCOMTR-MCNC: 126 MG/DL (ref 70–99)
GLUCOSE BLDC GLUCOMTR-MCNC: 149 MG/DL (ref 70–99)
GLUCOSE BLDC GLUCOMTR-MCNC: 152 MG/DL (ref 70–99)
GLUCOSE BLDC GLUCOMTR-MCNC: 205 MG/DL (ref 70–99)
MAGNESIUM SERPL-MCNC: 1.6 MG/DL (ref 1.6–2.6)
OSMOLALITY SERPL CALC.SUM OF ELEC: 288 MOSM/KG (ref 275–295)
POTASSIUM SERPL-SCNC: 4 MMOL/L (ref 3.5–5.1)
SODIUM SERPL-SCNC: 138 MMOL/L (ref 136–145)

## 2024-01-01 PROCEDURE — 83735 ASSAY OF MAGNESIUM: CPT | Performed by: HOSPITALIST

## 2024-01-01 PROCEDURE — 82962 GLUCOSE BLOOD TEST: CPT

## 2024-01-01 PROCEDURE — 80048 BASIC METABOLIC PNL TOTAL CA: CPT | Performed by: HOSPITALIST

## 2024-01-01 RX ORDER — MAGNESIUM OXIDE 400 MG/1
400 TABLET ORAL ONCE
Status: COMPLETED | OUTPATIENT
Start: 2024-01-01 | End: 2024-01-01

## 2024-01-01 NOTE — PLAN OF CARE
Pt a/ox4 on 1-2L of O2, attempting to wean. Good urine output. No complaints overnight. Call light in reach.   Problem: Patient Centered Care  Goal: Patient preferences are identified and integrated in the patient's plan of care  Description: Interventions:  - What would you like us to know as we care for you? From Home alone   - Provide timely, complete, and accurate information to patient/family  - Incorporate patient and family knowledge, values, beliefs, and cultural backgrounds into the planning and delivery of care  - Encourage patient/family to participate in care and decision-making at the level they choose  - Honor patient and family perspectives and choices  Outcome: Progressing     Problem: Diabetes/Glucose Control  Goal: Glucose maintained within prescribed range  Description: INTERVENTIONS:  - Monitor Blood Glucose as ordered  - Assess for signs and symptoms of hyperglycemia and hypoglycemia  - Administer ordered medications to maintain glucose within target range  - Assess barriers to adequate nutritional intake and initiate nutrition consult as needed  - Instruct patient on self management of diabetes  Outcome: Progressing     Problem: Patient/Family Goals  Goal: Patient/Family Long Term Goal  Description: Patient's Long Term Goal: Go back home     Interventions:  - Transition to oral lasix   - See additional Care Plan goals for specific interventions  Outcome: Progressing  Goal: Patient/Family Short Term Goal  Description: Patient's Short Term Goal: Feel less short of breath     Interventions:   - IV lasix  -Strict I and O   -Daily weight   - See additional Care Plan goals for specific interventions  Outcome: Progressing     Problem: RESPIRATORY - ADULT  Goal: Achieves optimal ventilation and oxygenation  Description: INTERVENTIONS:  - Assess for changes in respiratory status  - Assess for changes in mentation and behavior  - Position to facilitate oxygenation and minimize respiratory effort  -  Oxygen supplementation based on oxygen saturation or ABGs  - Provide Smoking Cessation handout, if applicable  - Encourage broncho-pulmonary hygiene including cough, deep breathe, Incentive Spirometry  - Assess the need for suctioning and perform as needed  - Assess and instruct to report SOB or any respiratory difficulty  - Respiratory Therapy support as indicated  - Manage/alleviate anxiety  - Monitor for signs/symptoms of CO2 retention  Outcome: Progressing

## 2024-01-01 NOTE — PLAN OF CARE
Alert x4. Denies pain or discomfort. Remains on 1.5L o2. Ambulated in [place]. Up in chair most of the day. Used the IS. IV lasix bid. New iv placed. VSS. Call light within reach.   Problem: Patient Centered Care  Goal: Patient preferences are identified and integrated in the patient's plan of care  Description: Interventions:  - What would you like us to know as we care for you? From Home alone   - Provide timely, complete, and accurate information to patient/family  - Incorporate patient and family knowledge, values, beliefs, and cultural backgrounds into the planning and delivery of care  - Encourage patient/family to participate in care and decision-making at the level they choose  - Honor patient and family perspectives and choices  Outcome: Progressing     Problem: Diabetes/Glucose Control  Goal: Glucose maintained within prescribed range  Description: INTERVENTIONS:  - Monitor Blood Glucose as ordered  - Assess for signs and symptoms of hyperglycemia and hypoglycemia  - Administer ordered medications to maintain glucose within target range  - Assess barriers to adequate nutritional intake and initiate nutrition consult as needed  - Instruct patient on self management of diabetes  Outcome: Progressing     Problem: Patient/Family Goals  Goal: Patient/Family Long Term Goal  Description: Patient's Long Term Goal: Go back home     Interventions:  - Transition to oral lasix   - See additional Care Plan goals for specific interventions  Outcome: Progressing  Goal: Patient/Family Short Term Goal  Description: Patient's Short Term Goal: Feel less short of breath     Interventions:   - IV lasix  -Strict I and O   -Daily weight   - See additional Care Plan goals for specific interventions  Outcome: Progressing     Problem: RESPIRATORY - ADULT  Goal: Achieves optimal ventilation and oxygenation  Description: INTERVENTIONS:  - Assess for changes in respiratory status  - Assess for changes in mentation and behavior  -  Position to facilitate oxygenation and minimize respiratory effort  - Oxygen supplementation based on oxygen saturation or ABGs  - Provide Smoking Cessation handout, if applicable  - Encourage broncho-pulmonary hygiene including cough, deep breathe, Incentive Spirometry  - Assess the need for suctioning and perform as needed  - Assess and instruct to report SOB or any respiratory difficulty  - Respiratory Therapy support as indicated  - Manage/alleviate anxiety  - Monitor for signs/symptoms of CO2 retention  Outcome: Progressing

## 2024-01-01 NOTE — PROGRESS NOTES
[facility] Hospitalist Progress Note     CC: Hospital Follow up    PCP: Fahad Erickson DO       Assessment/Plan:     Principal Problem:    Acute pulmonary edema (HCC)  Active Problems:    Hyperglycemia    Hypokalemia    Metabolic alkalosis    Pleural effusion    Respiratory insufficiency    Ms Mendez is an 78 female with hs of HTN, HLD, type 2 DM,  hs of paricarditis (s/p pericardial window), Afib on eliquis s/p cardioversion on 12/20, who presents with sob, and abdominal wall erythema.     Acute on chronic systolic CHF  Acute hypoxic resp failure   -Echo at [facility] on 12/18 with a EF of 45 to 50%  -BNP of 1200, troponin negative, chest x-ray with moderate pleural effusion  -IV Lasix 40 twice daily  -Hold ARB given recent CYNDI, do not want to precipitate CYNDI  -Monitor I's and O's closely-> >good UOP  -Cardiology consulted  -still on O2, continue IV lasix, renal function stable     Atrial Fibrillation with RVR  - s/p MATTHEW/ DCCV x 2 last week at [facility]  - Continue oral amnio load  -Continue metoprolol, continue Eliquis  - cards following  -monitor on telemetry    HTN   -Hold ARB     HLD  -Resume statin    Abnormal LFTs  -Are elevated at [facility], appear improved here  -Outpatient follow-up    T2DM  - hold oral meds  - SSI    Fungal Dermatitis  - in skin folds  - prescribed miconazole powder  - improved     FN:  - IVF: None  - Diet: Advance     DVT Prophy: Eliquis  Lines: PIV     Dispo: pending clinical course    Discussed with daughter at bedside    Discussed with RN and cardiology    Questions/concerns were discussed with patient and/or family by bedside.    Thank You,  David Mcdowell MD    Hospitalist with [facility]     Subjective:     Feeling better, breathing improved, legs still swollen, but improved, no cp     OBJECTIVE:    Blood pressure 122/54, pulse 69, temperature 98.4 °F (36.9 °C), temperature source Oral, resp. rate 17, height 5' 2\" (1.575 m), weight 226 lb 11.2 oz  (102.8 kg), SpO2 92%.    Temp:  [97.7 °F (36.5 °C)-98.4 °F (36.9 °C)] 98.4 °F (36.9 °C)  Pulse:  [64-69] 69  Resp:  [17-18] 17  BP: (122-147)/(54-62) 122/54  SpO2:  [92 %-98 %] 92 %      Intake/Output:    Intake/Output Summary (Last 24 hours) at 1/1/2024 1248  Last data filed at 1/1/2024 0800  Gross per 24 hour   Intake 805 ml   Output 600 ml   Net 205 ml       Last 3 Weights   01/01/24 0500 226 lb 11.2 oz (102.8 kg)   12/31/23 0316 226 lb 9.6 oz (102.8 kg)   12/30/23 0055 231 lb 14.4 oz (105.2 kg)   03/23/22 1025 223 lb (101.2 kg)   03/21/22 1352 225 lb (102.1 kg)   01/27/22 1111 225 lb (102.1 kg)       Exam   Gen: No acute distress, alert and oriented x3   Heent: NC AT, neck supple  Pulm: Lungs clear, normal respiratory effort  CV: Heart with regular rate and rhythm, + peripheral edema   Abd: Abdomen soft, nontender, nondistended   MSK: no clubbing, no cyanosis  Skin: no rashes or lesions  Neuro: AO*3, motor intact, no sensory deficits  Psyc: appropriate mood and affect      Data Review:       Labs:     Recent Labs   Lab 12/29/23  2104 12/30/23  0635   RBC 5.00 4.48   HGB 14.1 12.6   HCT 44.9 40.1   MCV 89.8 89.5   MCH 28.2 28.1   MCHC 31.4 31.4   RDW 14.0 13.8   NEPRELIM 4.79 3.59   WBC 7.3 6.3   .0 244.0         Recent Labs   Lab 12/30/23  0635 12/31/23  0729 12/31/23  1622 01/01/24  0548   * 125*  --  129*   BUN 13 14  --  14   CREATSERUM 0.94 0.94  --  0.95   EGFRCR 61 61  --  61   CA 8.9 8.9  --  9.0    134*  --  138   K 3.7 3.5  3.5 4.9 4.0    101  --  101   CO2 34.0* 34.0*  --  36.0*       Recent Labs   Lab 12/29/23 2104 12/30/23  0635   ALT 69* 53*   AST 29 25   ALB 3.9 3.5         Imaging:  XR CHEST AP PORTABLE  (CPT=71045)    Result Date: 12/29/2023  CONCLUSION:   Small to moderate left pleural effusion with hazy left basilar opacities, which could relate to compressive atelectasis although coexistent infection/pneumonia should be excluded on the basis of clinical parameters.   Effusion may be on the basis of congestive failure as there is background cardiomegaly, pulmonary vascular congestion, and basilar interstitial edema.  Radiographic follow-up to resolution is advised.   Dictated by (CST): Zain Hinds MD on 12/29/2023 at 8:26 PM     Finalized by (CST): Zain Hinds MD on 12/29/2023 at 8:28 PM             Meds:      amiodarone  400 mg Oral q12h    apixaban  5 mg Oral BID    miconazole   Topical BID    sertraline  50 mg Oral Daily    furosemide  40 mg Intravenous BID (Diuretic)    insulin aspart  1-5 Units Subcutaneous TID CC and HS    atorvastatin  40 mg Oral Daily       glucose **OR** glucose **OR** glucose-vitamin C **OR** dextrose **OR** glucose **OR** glucose **OR** glucose-vitamin C, acetaminophen, melatonin, polyethylene glycol (PEG 3350), sennosides, bisacodyl, fleet enema, ondansetron, metoclopramide, influenza virus vaccine PF

## 2024-01-01 NOTE — PROGRESS NOTES
[facility]    Cardiology Progress Note    Shae Mendez Patient Status:  Inpatient    1945 MRN [id number]   Location [facility] 3W/SW Attending David Mcdowell MD   Hosp Day # 3 PCP Fahad Erickson, DO       Assessment/Plan  Briefly, patient is a 79 yo woman with HTN, HDL, T2DM, hx of pericarditis s/p pericardial window, afib on eliquis s/p DCCV  who presented with symptoms of CHF.  She is diuresing well on IV lasix 40mg daily.    Acute decompensated CHF; LVEF mildly reduced at 45-50%.  Paroxysmal atrial fibrillation, now in NSR    PLAN  -- IV lasix 40mg BID through today and re-assess tomorrow. Her legs appear to be swollen past baseline still. She is walking but still has some dyspnea. Does not feel back to normal.  -- BMP daily, strict I/O  -- Continue amio 400mg BID  -- Continue Eliquis 5mg BID  -- Continue walking and elevating legs.     Subjective:   Has had good UOP. She is down about 5 lbs since admission. Net I/O -5.5 L.  Cr 0.95. Still has swelling in her legs but improved. Is able to walk but has some dyspnea still.    Patient Active Problem List   Diagnosis    Hypertension    Hyperlipidemia    Obesity    Diabetes mellitus type 2 without retinopathy (HCC)    OAB (overactive bladder)    Spondylolisthesis, grade 1    Primary osteoarthritis of right hip    Acute pulmonary edema (HCC)    Hyperglycemia    Hypokalemia    Metabolic alkalosis    Pleural effusion    Respiratory insufficiency       Objective:   Temp: 98.4 °F (36.9 °C)  Pulse: 69  Resp: 17  BP: 122/54    Intake/Output:     Intake/Output Summary (Last 24 hours) at 2024 1227  Last data filed at 2024 0800  Gross per 24 hour   Intake 805 ml   Output 600 ml   Net 205 ml       Last 3 Weights   24 0500 226 lb 11.2 oz (102.8 kg)   23 0316 226 lb 9.6 oz (102.8 kg)   23 0055 231 lb 14.4 oz (105.2 kg)   22 1025 223 lb (101.2 kg)   22 1352 225 lb (102.1 kg)   22 1111 225 lb (102.1 kg)        Tele: NSR    Physical Exam:    General: Alert and oriented x 3. No apparent distress. No respiratory or constitutional distress.  HEENT: Normocephalic, anicteric sclera, neck supple, no thyromegaly or adenopathy.  Neck: No JVD, carotids 2+, no bruits.  Cardiac: Regular rate and rhythm. S1, S2 normal. No murmur, pericardial rub, S3, thrill, heave or extra cardiac sounds.  Lungs: Clear without wheezes, rales, rhonchi or dullness.  Normal excursions and effort.  Abdomen: Soft, non-tender. No organosplenomegally, mass or rebound, BS-present.  Extremities: Without clubbing, cyanosis.  Peripheral pulses are 2+. +2 edema to thigh.  Neurologic: Alert and oriented, normal affect. No motor or coordinational deficit.  Skin: Warm and dry.     Laboratory/Data:    Labs:         Recent Labs   Lab 12/29/23 2104 12/30/23  0635   WBC 7.3 6.3   HGB 14.1 12.6   MCV 89.8 89.5   .0 244.0   INR 1.43*  --        Recent Labs   Lab 12/29/23 2104 12/30/23  0635 12/31/23  0729 12/31/23  1622 01/01/24  0548    142 134*  --  138   K 3.3* 3.7 3.5  3.5 4.9 4.0    103 101  --  101   CO2 34.0* 34.0* 34.0*  --  36.0*   BUN 16 13 14  --  14   CREATSERUM 1.21* 0.94 0.94  --  0.95   CA 9.4 8.9 8.9  --  9.0   MG  --  1.4* 1.5*  --  1.6   * 124* 125*  --  129*       Recent Labs   Lab 12/29/23 2104 12/30/23  0635   ALT 69* 53*   AST 29 25   ALB 3.9 3.5       No results for input(s): \"TROP\" in the last 168 hours.    Allergies:   Allergies   Allergen Reactions    Erythromycin     Penicillins     Sulfa Antibiotics        Medications:        Derik Arguelles MD  01/01/24

## 2024-01-02 LAB
ANION GAP SERPL CALC-SCNC: <0 MMOL/L (ref 0–18)
BUN BLD-MCNC: 13 MG/DL (ref 9–23)
BUN/CREAT SERPL: 13.8 (ref 10–20)
CALCIUM BLD-MCNC: 8.9 MG/DL (ref 8.7–10.4)
CHLORIDE SERPL-SCNC: 102 MMOL/L (ref 98–112)
CO2 SERPL-SCNC: 36 MMOL/L (ref 21–32)
CREAT BLD-MCNC: 0.94 MG/DL
EGFRCR SERPLBLD CKD-EPI 2021: 61 ML/MIN/1.73M2 (ref 60–?)
GLUCOSE BLD-MCNC: 164 MG/DL (ref 70–99)
GLUCOSE BLDC GLUCOMTR-MCNC: 127 MG/DL (ref 70–99)
GLUCOSE BLDC GLUCOMTR-MCNC: 134 MG/DL (ref 70–99)
GLUCOSE BLDC GLUCOMTR-MCNC: 140 MG/DL (ref 70–99)
GLUCOSE BLDC GLUCOMTR-MCNC: 200 MG/DL (ref 70–99)
MAGNESIUM SERPL-MCNC: 1.7 MG/DL (ref 1.6–2.6)
OSMOLALITY SERPL CALC.SUM OF ELEC: 284 MOSM/KG (ref 275–295)
POTASSIUM SERPL-SCNC: 3.6 MMOL/L (ref 3.5–5.1)
POTASSIUM SERPL-SCNC: 4.8 MMOL/L (ref 3.5–5.1)
SODIUM SERPL-SCNC: 135 MMOL/L (ref 136–145)

## 2024-01-02 PROCEDURE — 97116 GAIT TRAINING THERAPY: CPT

## 2024-01-02 PROCEDURE — 80048 BASIC METABOLIC PNL TOTAL CA: CPT | Performed by: HOSPITALIST

## 2024-01-02 PROCEDURE — 83735 ASSAY OF MAGNESIUM: CPT | Performed by: HOSPITALIST

## 2024-01-02 PROCEDURE — 84132 ASSAY OF SERUM POTASSIUM: CPT | Performed by: HOSPITALIST

## 2024-01-02 PROCEDURE — 82962 GLUCOSE BLOOD TEST: CPT

## 2024-01-02 PROCEDURE — 97530 THERAPEUTIC ACTIVITIES: CPT

## 2024-01-02 RX ORDER — MAGNESIUM OXIDE 400 MG/1
400 TABLET ORAL ONCE
Status: COMPLETED | OUTPATIENT
Start: 2024-01-02 | End: 2024-01-02

## 2024-01-02 RX ORDER — FUROSEMIDE 40 MG/1
40 TABLET ORAL
Status: DISCONTINUED | OUTPATIENT
Start: 2024-01-02 | End: 2024-01-03

## 2024-01-02 RX ORDER — POTASSIUM CHLORIDE 20 MEQ/1
40 TABLET, EXTENDED RELEASE ORAL EVERY 4 HOURS
Status: COMPLETED | OUTPATIENT
Start: 2024-01-02 | End: 2024-01-02

## 2024-01-02 NOTE — PROGRESS NOTES
[facility] Hospitalist Progress Note     CC: Hospital Follow up    PCP: Fahad Erickson DO       Assessment/Plan:     Principal Problem:    Acute pulmonary edema (HCC)  Active Problems:    Hyperglycemia    Hypokalemia    Metabolic alkalosis    Pleural effusion    Respiratory insufficiency    Ms Mendez is an 78 female with hs of HTN, HLD, type 2 DM,  hs of paricarditis (s/p pericardial window), Afib on eliquis s/p cardioversion on 12/20, who presents with sob, and abdominal wall erythema, found to have acute hypoxic resp failure 2/2 acute on chronic sys CHF, clinically improved with IV lasix.       Acute on chronic systolic CHF  Acute hypoxic resp failure   -Echo at [facility] on 12/18 with a EF of 45 to 50%  -BNP of 1200, troponin negative, chest x-ray with moderate pleural effusion  -IV Lasix 40 twice daily--> transition to PO tomorrow  -Hold ARB given recent CYNDI, do not want to precipitate CYNDI  -Monitor I's and O's closely-> >good UOP  -Cardiology consulted  -wean off O2 this morning    Atrial Fibrillation with RVR  - s/p MATTHEW/ DCCV x 2 last week at [facility]  - Continue oral amnio load  -Continue metoprolol, continue Eliquis  - cards following  -monitor on telemetry    HTN   -Hold ARB     HLD  -Resume statin    Abnormal LFTs  -Are elevated at [facility], appear improved here  -Outpatient follow-up    T2DM  - hold oral meds  - SSI    Fungal Dermatitis  - in skin folds  - prescribed miconazole powder  - improved     FN:  - IVF: None  - Diet: Advance     DVT Prophy: Eliquis  Lines: PIV     Dispo: pending clinical course    Discussed with daughter over phone    Discussed with RN and cardiology    Questions/concerns were discussed with patient and/or family by bedside.    Thank You,  David Mcdowell MD    Hospitalist with [facility]     Subjective:     Feeling better, breathing improved, legs much better, no cp     OBJECTIVE:    Blood pressure 119/49, pulse 64, temperature 97.9 °F (36.6  °C), temperature source Oral, resp. rate 20, height 5' 2\" (1.575 m), weight 220 lb 12.8 oz (100.2 kg), SpO2 94%.    Temp:  [97.9 °F (36.6 °C)-98.4 °F (36.9 °C)] 97.9 °F (36.6 °C)  Pulse:  [61-71] 64  Resp:  [16-20] 20  BP: (113-147)/(49-58) 119/49  SpO2:  [93 %-97 %] 94 %      Intake/Output:    Intake/Output Summary (Last 24 hours) at 1/2/2024 1112  Last data filed at 1/2/2024 0759  Gross per 24 hour   Intake 480 ml   Output 1050 ml   Net -570 ml       Last 3 Weights   01/02/24 0443 220 lb 12.8 oz (100.2 kg)   01/01/24 0500 226 lb 11.2 oz (102.8 kg)   12/31/23 0316 226 lb 9.6 oz (102.8 kg)   12/30/23 0055 231 lb 14.4 oz (105.2 kg)   03/23/22 1025 223 lb (101.2 kg)   03/21/22 1352 225 lb (102.1 kg)   01/27/22 1111 225 lb (102.1 kg)       Exam   Gen: No acute distress, alert and oriented x3   Heent: NC AT, neck supple  Pulm: Lungs clear, normal respiratory effort  CV: Heart with regular rate and rhythm, trace peripheral edema   Abd: Abdomen soft, nontender, nondistended   MSK: no clubbing, no cyanosis  Skin: no rashes or lesions  Neuro: AO*3, motor intact, no sensory deficits  Psyc: appropriate mood and affect      Data Review:       Labs:     Recent Labs   Lab 12/29/23  2104 12/30/23  0635   RBC 5.00 4.48   HGB 14.1 12.6   HCT 44.9 40.1   MCV 89.8 89.5   MCH 28.2 28.1   MCHC 31.4 31.4   RDW 14.0 13.8   NEPRELIM 4.79 3.59   WBC 7.3 6.3   .0 244.0         Recent Labs   Lab 12/31/23  0729 12/31/23  1622 01/01/24  0548 01/02/24  0603   *  --  129* 164*   BUN 14  --  14 13   CREATSERUM 0.94  --  0.95 0.94   EGFRCR 61  --  61 61   CA 8.9  --  9.0 8.9   *  --  138 135*   K 3.5  3.5 4.9 4.0 3.6     --  101 102   CO2 34.0*  --  36.0* 36.0*       Recent Labs   Lab 12/29/23  2104 12/30/23  0635   ALT 69* 53*   AST 29 25   ALB 3.9 3.5         Imaging:  No results found.      Meds:      potassium chloride  40 mEq Oral Q4H    furosemide  40 mg Oral BID (Diuretic)    amiodarone  400 mg Oral q12h     apixaban  5 mg Oral BID    miconazole   Topical BID    sertraline  50 mg Oral Daily    insulin aspart  1-5 Units Subcutaneous TID CC and HS    atorvastatin  40 mg Oral Daily       glucose **OR** glucose **OR** glucose-vitamin C **OR** dextrose **OR** glucose **OR** glucose **OR** glucose-vitamin C, acetaminophen, melatonin, polyethylene glycol (PEG 3350), sennosides, bisacodyl, fleet enema, ondansetron, metoclopramide, influenza virus vaccine PF

## 2024-01-02 NOTE — PLAN OF CARE
Patient denies shortness of breath. Patient ambulating in hallways without complaints. IV diuretics continued. Plan is to transition to oral diuretics tomorrow. Patient and family updated on plan of care.     Problem: Patient Centered Care  Goal: Patient preferences are identified and integrated in the patient's plan of care  Description: Interventions:  - What would you like us to know as we care for you? From Home alone   - Provide timely, complete, and accurate information to patient/family  - Incorporate patient and family knowledge, values, beliefs, and cultural backgrounds into the planning and delivery of care  - Encourage patient/family to participate in care and decision-making at the level they choose  - Honor patient and family perspectives and choices  Outcome: Progressing     Problem: Diabetes/Glucose Control  Goal: Glucose maintained within prescribed range  Description: INTERVENTIONS:  - Monitor Blood Glucose as ordered  - Assess for signs and symptoms of hyperglycemia and hypoglycemia  - Administer ordered medications to maintain glucose within target range  - Assess barriers to adequate nutritional intake and initiate nutrition consult as needed  - Instruct patient on self management of diabetes  Outcome: Progressing     Problem: Patient/Family Goals  Goal: Patient/Family Long Term Goal  Description: Patient's Long Term Goal: Go back home     Interventions:  - Transition to oral lasix   - See additional Care Plan goals for specific interventions  Outcome: Progressing  Goal: Patient/Family Short Term Goal  Description: Patient's Short Term Goal: Feel less short of breath     Interventions:   - IV lasix  -Strict I and O   -Daily weight   - See additional Care Plan goals for specific interventions  Outcome: Progressing     Problem: RESPIRATORY - ADULT  Goal: Achieves optimal ventilation and oxygenation  Description: INTERVENTIONS:  - Assess for changes in respiratory status  - Assess for changes in  mentation and behavior  - Position to facilitate oxygenation and minimize respiratory effort  - Oxygen supplementation based on oxygen saturation or ABGs  - Provide Smoking Cessation handout, if applicable  - Encourage broncho-pulmonary hygiene including cough, deep breathe, Incentive Spirometry  - Assess the need for suctioning and perform as needed  - Assess and instruct to report SOB or any respiratory difficulty  - Respiratory Therapy support as indicated  - Manage/alleviate anxiety  - Monitor for signs/symptoms of CO2 retention  Outcome: Progressing

## 2024-01-02 NOTE — PHYSICAL THERAPY NOTE
PHYSICAL THERAPY TREATMENT NOTE - INPATIENT     Room Number: COF15/COF15-A       Presenting Problem: acute pulmonary edema     Problem List  Principal Problem:    Acute pulmonary edema (HCC)  Active Problems:    Hyperglycemia    Hypokalemia    Metabolic alkalosis    Pleural effusion    Respiratory insufficiency    PHYSICAL THERAPY ASSESSMENT     Patient received seated in bedside recliner at initiation of session agreeable to participation in PT, daughter at bedside. Patient tolerates treatment well, demonstrates improvement in ambulation tolerance in comparison to previous session. Patient reports increased BUE pain with use of rollator, educated patient and adjusted rollator to proper height. Patient able to ambulate ~150 feet with supervision and BUE support on rollator.    The patient's Approx Degree of Impairment: 28.97% has been calculated based on documentation in the [facility] '6 clicks' Inpatient Basic Mobility Short Form.  Research supports that patients with this level of impairment may benefit from Home with no services.    DISCHARGE RECOMMENDATIONS  PT Discharge Recommendations: Home with home health PT;Intermittent Supervision     PLAN  PT Treatment Plan: Bed mobility;Body mechanics;Coordination;Energy conservation;Gait training;Neuromuscular re-educate;Strengthening;Stair training;Transfer training;Balance training  Frequency (Obs): 3-5x/week    SUBJECTIVE  \"I have been walking well on my own\"    OBJECTIVE  Precautions: Cardiac    PAIN ASSESSMENT   Rating: Unable to rate  Location: back  Management Techniques: Breathing techniques;Body mechanics;Activity promotion;Repositioning;Relaxation    BALANCE  Static Sitting: Good  Dynamic Sitting: Good  Static Standing: Good  Dynamic Standing: Good    ACTIVITY TOLERANCE  Pulse: 87  Heart Rate Source: Monitor    O2 WALK  Oxygen Therapy  SPO2% on Room Air at Rest: 90  SPO2% Ambulation on Room Air: 92    AM-[facility] '6-Clicks' INPATIENT SHORT FORM - BASIC MOBILITY  How  much difficulty does the patient currently have...  Patient Difficulty: Turning over in bed (including adjusting bedclothes, sheets and blankets)?: None   Patient Difficulty: Sitting down on and standing up from a chair with arms (e.g., wheelchair, bedside commode, etc.): A Little   Patient Difficulty: Moving from lying on back to sitting on the side of the bed?: A Little   How much help from another person does the patient currently need...   Help from Another: Moving to and from a bed to a chair (including a wheelchair)?: None   Help from Another: Need to walk in hospital room?: None   Help from Another: Climbing 3-5 steps with a railing?: A Little     AM-PAC Score:  Raw Score: 21   Approx Degree of Impairment: 28.97%   Standardized Score (AM-PAC Scale): 50.25   CMS Modifier (G-Code):     FUNCTIONAL ABILITY STATUS  Functional Mobility/Gait Assessment  Gait Assistance: Supervision  Distance (ft): ~150 feet  Assistive Device: Other (Comment) (rollator)  Pattern:  (decreased soto, decreased step length, forward flexed posture, narrow base of support, verbal cues for sequencing and rolling walker management.)    MOBILITY:  ROLLING: not tested (patient seated up in bedside recliner at initiation of session)  SUPINE TO SIT: not tested   SIT TO SUPINE: not tested   SIT TO STAND: supervision/setup assist   STAND TO SIT: supervision/setup assist   BED TO/FROM CHAIR: supervision/setup assist     THERAPEUTIC EXERCISES  Lower Extremity Ankle pumps  Glut sets  LAQ  Toe raises     Position Sitting       Patient End of Session: Up in chair;Needs met;Call light within reach;Family present    CURRENT GOALS   Goals to be met by: 1/6/24  Patient Goal Patient's self-stated goal is: return to PLOF   Goal #1 Patient is able to demonstrate supine - sit EOB @ level: independent      Goal #1   Current Status  Not tested   Goal #2 Patient is able to demonstrate transfers Sit to/from Stand at assistance level: modified independent with  rollator      Goal #2  Current Status  Progressing 24   Goal #3 Patient is able to ambulate 300 feet with assist device: rollator at assistance level: modified independent with SpO2 >90% with activity   Goal #3   Current Status  Progressing 24   Goal #4 Patient will negotiate 13 stairs/one curb w/ assistive device and supervision with SpO2>90% with activity   Goal #4   Current Status  Not tested   Goal #5 Patient to demonstrate independence with home activity/exercise instructions provided to patient in preparation for discharge.   Goal #5   Current Status  Progressing 24     Gait Trainin minutes  Therapeutic Activity: 8 minutes    Noemy Cárdenas, PT, DPT

## 2024-01-02 NOTE — DISCHARGE INSTRUCTIONS
Sometimes managing your health at home requires assistance.  The [facility] team has recognized your preference to use [facility].  They can be reached by phone at [phone].  The fax number for your reference is [phone].  A representative from the home health agency will contact you or your family to schedule your first visit.

## 2024-01-02 NOTE — PLAN OF CARE
Patient denied pain or discomfort. BLE swelling - compression socks applied. Patient ambulate with minimal assist, saturating well while ambulating on the hallway on RA. 1.5L NC, during the night for comfort. Had BM overnight. On IV lasix BID - strict I&Os and daily weight. Plan to transition IV lasix to PO and discharge home with HH, pending medical clearance.     Problem: Patient Centered Care  Goal: Patient preferences are identified and integrated in the patient's plan of care  Description: Interventions:  - What would you like us to know as we care for you? From Home alone   - Provide timely, complete, and accurate information to patient/family  - Incorporate patient and family knowledge, values, beliefs, and cultural backgrounds into the planning and delivery of care  - Encourage patient/family to participate in care and decision-making at the level they choose  - Honor patient and family perspectives and choices  Outcome: Progressing     Problem: Diabetes/Glucose Control  Goal: Glucose maintained within prescribed range  Description: INTERVENTIONS:  - Monitor Blood Glucose as ordered  - Assess for signs and symptoms of hyperglycemia and hypoglycemia  - Administer ordered medications to maintain glucose within target range  - Assess barriers to adequate nutritional intake and initiate nutrition consult as needed  - Instruct patient on self management of diabetes  Outcome: Progressing     Problem: Patient/Family Goals  Goal: Patient/Family Long Term Goal  Description: Patient's Long Term Goal: Go back home     Interventions:  - Transition to oral lasix   - See additional Care Plan goals for specific interventions  Outcome: Progressing  Goal: Patient/Family Short Term Goal  Description: Patient's Short Term Goal: Feel less short of breath     Interventions:   - IV lasix  -Strict I and O   -Daily weight   - See additional Care Plan goals for specific interventions  Outcome: Progressing     Problem: RESPIRATORY -  ADULT  Goal: Achieves optimal ventilation and oxygenation  Description: INTERVENTIONS:  - Assess for changes in respiratory status  - Assess for changes in mentation and behavior  - Position to facilitate oxygenation and minimize respiratory effort  - Oxygen supplementation based on oxygen saturation or ABGs  - Provide Smoking Cessation handout, if applicable  - Encourage broncho-pulmonary hygiene including cough, deep breathe, Incentive Spirometry  - Assess the need for suctioning and perform as needed  - Assess and instruct to report SOB or any respiratory difficulty  - Respiratory Therapy support as indicated  - Manage/alleviate anxiety  - Monitor for signs/symptoms of CO2 retention  Outcome: Progressing

## 2024-01-02 NOTE — HOME CARE LIAISON
Referral received from EDUARDO/ZANE team via [facility]. Discussed with patient's dtr Lilia the recommendation for home health services, dtr agreeable, and all questions answered. Updated EDUARDO Carreno.

## 2024-01-02 NOTE — CM/SW NOTE
Reservation made to HC per pt dtr request.    SW/CM to remain available for support and/or discharge planning.      MARIOLA Woodall, LSW  Social Work   Ext:#[phone]

## 2024-01-02 NOTE — PROGRESS NOTES
[facility]    Cardiology Progress Note    Shae Mendez Patient Status:  Inpatient    1945 MRN [id number]   Location [facility] 3W/SW Attending David Mcdowell MD   Hosp Day # 4 PCP Fahad Erickson, DO       Assessment/Plan  Briefly, patient is a 79 yo woman with HTN, HDL, T2DM, hx of pericarditis s/p pericardial window, afib on eliquis s/p DCCV  who presented with symptoms of CHF.  She is diuresing well on IV lasix 40mg daily.    Acute decompensated CHF; LVEF mildly reduced at 45-50%.  Paroxysmal atrial fibrillation, now in NSR    PLAN  -- IV lasix 40mg this am given. Will do lasix 40mg PO tonight and assess output. If she has good output, she can have 40mg PO as an outpatient and follow up with me in clinic.  -- BMP daily, strict I/O  -- Continue amio 400mg BID  -- Continue Eliquis 5mg BID  -- Continue walking and elevating legs.     Subjective:   Has had good UOP. She is down about 5 lbs since admission. Net I/O -5.2 L.  Cr stable. LE swelling significantly improved.    Patient Active Problem List   Diagnosis    Hypertension    Hyperlipidemia    Obesity    Diabetes mellitus type 2 without retinopathy (HCC)    OAB (overactive bladder)    Spondylolisthesis, grade 1    Primary osteoarthritis of right hip    Acute pulmonary edema (HCC)    Hyperglycemia    Hypokalemia    Metabolic alkalosis    Pleural effusion    Respiratory insufficiency       Objective:   Temp: 97.9 °F (36.6 °C)  Pulse: 87  Resp: 20  BP: 119/49    Intake/Output:     Intake/Output Summary (Last 24 hours) at 2024 1603  Last data filed at 2024 1500  Gross per 24 hour   Intake 1160 ml   Output 1850 ml   Net -690 ml       Last 3 Weights   24 0443 220 lb 12.8 oz (100.2 kg)   24 0500 226 lb 11.2 oz (102.8 kg)   23 0316 226 lb 9.6 oz (102.8 kg)   23 0055 231 lb 14.4 oz (105.2 kg)   22 1025 223 lb (101.2 kg)   22 1352 225 lb (102.1 kg)   22 1111 225 lb (102.1 kg)       Tele:  NSR    Physical Exam:    General: Alert and oriented x 3. No apparent distress. No respiratory or constitutional distress.  HEENT: Normocephalic, anicteric sclera, neck supple, no thyromegaly or adenopathy.  Neck: No JVD, carotids 2+, no bruits.  Cardiac: Regular rate and rhythm. S1, S2 normal. No murmur, pericardial rub, S3, thrill, heave or extra cardiac sounds.  Lungs: Clear without wheezes, rales, rhonchi or dullness.  Normal excursions and effort.  Abdomen: Soft, non-tender. No organosplenomegally, mass or rebound, BS-present.  Extremities: Without clubbing, cyanosis.  Peripheral pulses are 2+. +2 edema ankle  Neurologic: Alert and oriented, normal affect. No motor or coordinational deficit.  Skin: Warm and dry.     Laboratory/Data:    Labs:         Recent Labs   Lab 12/29/23 2104 12/30/23  0635   WBC 7.3 6.3   HGB 14.1 12.6   MCV 89.8 89.5   .0 244.0   INR 1.43*  --        Recent Labs   Lab 12/29/23 2104 12/30/23  0635 12/31/23  0729 12/31/23  1622 01/01/24  0548 01/02/24  0603    142 134*  --  138 135*   K 3.3* 3.7 3.5  3.5 4.9 4.0 3.6    103 101  --  101 102   CO2 34.0* 34.0* 34.0*  --  36.0* 36.0*   BUN 16 13 14  --  14 13   CREATSERUM 1.21* 0.94 0.94  --  0.95 0.94   CA 9.4 8.9 8.9  --  9.0 8.9   MG  --  1.4* 1.5*  --  1.6 1.7   * 124* 125*  --  129* 164*       Recent Labs   Lab 12/29/23 2104 12/30/23  0635   ALT 69* 53*   AST 29 25   ALB 3.9 3.5       No results for input(s): \"TROP\" in the last 168 hours.    Allergies:   Allergies   Allergen Reactions    Erythromycin     Penicillins     Sulfa Antibiotics        Medications:        Derik Arguelles MD  01/02/24

## 2024-01-03 VITALS
HEART RATE: 69 BPM | HEIGHT: 62 IN | SYSTOLIC BLOOD PRESSURE: 122 MMHG | OXYGEN SATURATION: 92 % | WEIGHT: 217.69 LBS | TEMPERATURE: 98 F | DIASTOLIC BLOOD PRESSURE: 50 MMHG | BODY MASS INDEX: 40.06 KG/M2 | RESPIRATION RATE: 20 BRPM

## 2024-01-03 PROBLEM — E87.3 METABOLIC ALKALOSIS: Status: RESOLVED | Noted: 2023-12-29 | Resolved: 2024-01-03

## 2024-01-03 PROBLEM — R06.89 RESPIRATORY INSUFFICIENCY: Status: RESOLVED | Noted: 2023-12-29 | Resolved: 2024-01-03

## 2024-01-03 PROBLEM — E87.6 HYPOKALEMIA: Status: RESOLVED | Noted: 2023-12-29 | Resolved: 2024-01-03

## 2024-01-03 PROBLEM — J90 PLEURAL EFFUSION: Status: RESOLVED | Noted: 2023-12-29 | Resolved: 2024-01-03

## 2024-01-03 PROBLEM — I48.0 PAF (PAROXYSMAL ATRIAL FIBRILLATION) (HCC): Status: ACTIVE | Noted: 2024-01-03

## 2024-01-03 PROBLEM — R73.9 HYPERGLYCEMIA: Status: RESOLVED | Noted: 2023-12-29 | Resolved: 2024-01-03

## 2024-01-03 LAB
ALBUMIN SERPL-MCNC: 3.8 G/DL (ref 3.2–4.8)
ALP LIVER SERPL-CCNC: 91 U/L
ALT SERPL-CCNC: 33 U/L
ANION GAP SERPL CALC-SCNC: 4 MMOL/L (ref 0–18)
AST SERPL-CCNC: 35 U/L (ref ?–34)
BILIRUB DIRECT SERPL-MCNC: 0.4 MG/DL (ref ?–0.3)
BILIRUB SERPL-MCNC: 0.9 MG/DL (ref 0.2–1.1)
BUN BLD-MCNC: 14 MG/DL (ref 9–23)
BUN/CREAT SERPL: 15.9 (ref 10–20)
CALCIUM BLD-MCNC: 9.2 MG/DL (ref 8.7–10.4)
CHLORIDE SERPL-SCNC: 101 MMOL/L (ref 98–112)
CO2 SERPL-SCNC: 33 MMOL/L (ref 21–32)
CREAT BLD-MCNC: 0.88 MG/DL
EGFRCR SERPLBLD CKD-EPI 2021: 66 ML/MIN/1.73M2 (ref 60–?)
GLUCOSE BLD-MCNC: 149 MG/DL (ref 70–99)
GLUCOSE BLDC GLUCOMTR-MCNC: 148 MG/DL (ref 70–99)
GLUCOSE BLDC GLUCOMTR-MCNC: 243 MG/DL (ref 70–99)
MAGNESIUM SERPL-MCNC: 1.9 MG/DL (ref 1.6–2.6)
OSMOLALITY SERPL CALC.SUM OF ELEC: 289 MOSM/KG (ref 275–295)
POTASSIUM SERPL-SCNC: 4 MMOL/L (ref 3.5–5.1)
PROT SERPL-MCNC: 6.8 G/DL (ref 5.7–8.2)
SODIUM SERPL-SCNC: 138 MMOL/L (ref 136–145)

## 2024-01-03 PROCEDURE — 82962 GLUCOSE BLOOD TEST: CPT

## 2024-01-03 PROCEDURE — 80076 HEPATIC FUNCTION PANEL: CPT | Performed by: NURSE PRACTITIONER

## 2024-01-03 PROCEDURE — 80048 BASIC METABOLIC PNL TOTAL CA: CPT | Performed by: HOSPITALIST

## 2024-01-03 PROCEDURE — 83735 ASSAY OF MAGNESIUM: CPT | Performed by: HOSPITALIST

## 2024-01-03 RX ORDER — CHOLECALCIFEROL (VITAMIN D3) 125 MCG
2000 CAPSULE ORAL
Status: SHIPPED | COMMUNITY
Start: 2024-01-03

## 2024-01-03 RX ORDER — METOPROLOL TARTRATE 50 MG/1
50 TABLET, FILM COATED ORAL 2 TIMES DAILY
COMMUNITY
Start: 2023-12-18 | End: 2024-01-03

## 2024-01-03 RX ORDER — OLMESARTAN MEDOXOMIL 40 MG/1
20 TABLET ORAL DAILY
Status: SHIPPED | COMMUNITY
Start: 2024-01-03

## 2024-01-03 RX ORDER — FUROSEMIDE 40 MG/1
40 TABLET ORAL
Qty: 60 TABLET | Refills: 0 | Status: SHIPPED | OUTPATIENT
Start: 2024-01-03 | End: 2024-02-02

## 2024-01-03 RX ORDER — CYCLOBENZAPRINE HCL 10 MG
10 TABLET ORAL 3 TIMES DAILY PRN
Status: ON HOLD | COMMUNITY
Start: 2023-09-29 | End: 2024-01-03

## 2024-01-03 NOTE — DISCHARGE PLANNING
Patient cleared for discharge. After visit summary given to patient. Patient and daughter educated on medications, follow up care and when to seek care. All current questions answered. Patient to discharge home with home health care via private vehicle. All belongings with patient.

## 2024-01-03 NOTE — DISCHARGE SUMMARY
[facility]ist Discharge Summary   Patient ID:  Shae Mendez  [id number]  78 year old  8/12/1945    Admit date: 12/29/2023  Discharge date: 1/3/2024    Primary Care Physician: Fahad Erickson DO   Attending Physician: Ajit Lange MD   Consults:   Consultants         Provider   Role Specialty     Derik Arguelles MD  Consulting Physician Interventional, Cardiology            Hospital Discharge Diagnoses:   Acute pulmonary edema (HCC)  ----See D/C Summary for further Dx    Risk of Readmission Lace+ Score: 46  59-90 High Risk  29-58 Medium Risk  0-28   Low Risk.    TCM Follow-Up Recommendation:  LACE 29-58: Moderate Risk of readmission after discharge from the hospital.    Please note that only IHP DMG and EMG patients enrolled in the Medicare ACO, BCBS ACO and [facility] HMOs will be handled by the [facility] Care Management team.  For all other patients, please follow usual protocol for discharge care transition.    Reason for admission  Per H/P Dated 12/30/2023 by Dr Mcdowell   Ms Mendez is an 78 female with hs of HTN, HLD, type 2 DM,  hs of paricarditis (s/p pericardial window), Afib on eliquis s/p cardioversion on 12/20, who presents with sob, and abdominal wall redness.  Patient states she was just discharged from [facility] on 12/[facility], she was admitted there after a cardioversion and was noted to have CYNDI, also noted to have a fungal skin infection underneath her pannus.  She was noted to have some slight progression of the erythema underneath her abdominal folds yesterday, and also was slightly more short of breath according to the family, so was taken to the immediate care center and sent to the ER.  Her chest showed some pulmonary edema, she did note some orthopnea and persistent lower extremity edema as well, so she was admitted for further management, she denies any chest pain, no nausea or vomiting, no fever or chills, no headaches or vision changes.     Hospital Course:     Vanessa  is an 78 female with hs of HTN, HLD, type 2 DM,  hs of paricarditis (s/p pericardial window), Afib on eliquis s/p cardioversion on 12/20, who presents with sob, and abdominal wall erythema, found to have acute hypoxic resp failure 2/2 acute on chronic sys CHF, clinically improved with IV lasix and patient to oral Lasix twice daily.  Patient does have known sleep apnea that has been untreated, recommend patient get repeat sleep study in the outpatient setting.  Of note patient is moving to [place] January 29 to live close near her daughter.  Patient encouraged to get enough medications to allow transition to new doctors in the [place] area.  Appointments will be made for the patient to follow-up with her primary and cardiology, see below for details     Acute on Chronic systolic CHF  12/18 Echo at [facility] with a EF of 45 to 50%  -BNP of 1205  -chest x-ray with moderate pleural effusion  -lasix 40 mg bid at discharge  -discharge wt 217 lbs   -unable to tolerate rhonda hose, trial of tubi grib   -follow up with cardiology     Acute hypoxic resp failure 2/2 CHF and LEILA (not using cpap)-resolved   -home o2 study  -rec repeat leila eval once pt is in [place] as we will be unable to facilitate getting this done prior to patient leaving.     PAF S/P  MATTHEW/ DCCV x 2 at [facility] ~12/20  - Continue oral amnio load, follow up with [facility] to decrease dosage   -has not been on beta blocker  -continue eliquis, currently samples she states that her co-pay will be approximately $300 which she states she can afford but wanted to know if there were cheaper options, prescription sent for Xarelto and unfortunately co-pay was approximately $532    HTN   -defer resumption of olmesartan to cards      HLD  -continue statin     Abnormal LFTs  -Are elevated at good [facility], appear improved here  -Outpatient follow-up    T2DM  -A1C 7.8  -resume home metformin     Fungal Dermatitis  in skin folds  -miconazole powder    MA/WILLIAMS Reach  -Re- Entry: no  -Consults:  cards   -Discharge Needs: none  -Appointments: PCP  1/8 and cards 1/9    EXAM:   GENERAL: no apparent distress, overweight  NEURO: A/A Ox3  RESP: non labored, CTA  CARDIO: Regular, no murmur  ABD: soft, NT, ND, BS+  EXTREMITIES: trace soft tissue swelling to ankles     Discharge Instructions     Medication List        START taking these medications      rivaroxaban 20 MG Tabs  Commonly known as: Xarelto  Take 1 tablet (20 mg total) by mouth nightly for 30 doses.            CHANGE how you take these medications      cholecalciferol 50 MCG (2000 UT) Tabs  What changed: how much to take     furosemide 40 MG Tabs  Commonly known as: Lasix  Take 1 tablet (40 mg total) by mouth BID (Diuretic).  What changed:   medication strength  how much to take  when to take this            CONTINUE taking these medications      amiodarone 200 MG Tabs  Commonly known as: Pacerone     atorvastatin 40 MG Tabs  Commonly known as: Lipitor  Take 1 tablet (40 mg total) by mouth daily.     Blood Glucose Monitoring Suppl Kit  1 each by Other route daily.     clotrimazole-betamethasone 1-0.05 % Crea  Commonly known as: Lotrisone     fluticasone propionate 50 MCG/ACT Susp  Commonly known as: Flonase  USE TWO SPRAY(S) IN EACH NOSTRIL ONCE DAILY     FreeStyle Lancets Misc  Check blood sugar once a day     FreeStyle Lite Test Strp  1 strip by In Vitro route daily as needed.     metFORMIN  MG Tb24  Commonly known as: Glucophage XR  Take 1 tablet (500 mg total) by mouth daily with breakfast.     Nystatin 844434 UNIT/GM Powd     PROTONIX OR     VITAMIN B COMPLEX OR            STOP taking these medications      cyclobenzaprine 10 MG Tabs  Commonly known as: Flexeril     indapamide 2.5 MG Tabs  Commonly known as: Lozol     Ketorolac Tromethamine 0.4 % Soln  Commonly known as: ACULAR     metoprolol tartrate 50 MG Tabs  Commonly known as: Lopressor     moxifloxacin 0.5 % Soln  Commonly known as: Vigamox     ofloxacin 0.3 % Soln  Commonly known as:  Ocuflox     prednisoLONE 1 % Susp  Commonly known as: Pred Forte     sertraline 25 MG Tabs  Commonly known as: Zoloft            ASK your doctor about these medications      apixaban 5 MG Tabs  Commonly known as: Eliquis     Olmesartan Medoxomil 40 MG Tabs  Commonly known as: BENICAR  Take 1 tablet (40 mg total) by mouth daily.               Where to Get Your Medications        These medications were sent to [facility] [address] [place] [address] [phone], [phone]  [address] [place] [address]      Phone: [phone]   furosemide 40 MG Tabs  rivaroxaban 20 MG Tabs       Important follow up:   Follow-up Information       Fahad Erickson, DO Follow up.    Specialty: Internal Medicine  Contact information:  [address]  [facility] [address] [phone]               Derik Arguelles MD Follow up.    Specialty: Interventional, Cardiology  Contact information:  [address]   [address]  [facility] [id number] [phone]                             Disposition: home  Discharged Condition: stable    =========================================================================================================================    I Reconciled current and discharge medications on day of discharge  Patient had opportunity to ask questions and state understand and agree with therapeutic plan as outlined    Total Time Coordinating Care: greater than 30 minutes  Is this a shared or split note between Advanced Practice Provider and Physician? Yes    Note: This chart was prepared using voice recognition software and may contain unintended word substitution errors.     Aarti Culp RN, NP   [facility] Hospitalist Team   1/3/2024      SEE ATTENDING NOTE BELOW        Patient seen and examined independently.  Discussed with APN and agree with note above    Patient improved.  Stable for discharge to home    GEN: NAD  RESP: CTA B/L  CVS: irregularly [id number]  EXT: No E/C/C    Time  of discharge >30 minutes    Ajit Lange MD  [facility] Hospitalist

## 2024-01-03 NOTE — DISCHARGE PLANNING
I called the pharmacy below to inquire about the copay of Xarelto before patient discharges.    [facility] [address] [place] [address]     The patient's copay is $530.38 for 30 day supply. Aarti RUSSELL notified and plan to keep patient on eliquis at this time.     Nita CHAPA, Discharge Leader [phone]

## 2024-01-03 NOTE — PROGRESS NOTES
[facility]    Cardiology Progress Note    Shae Mendez Patient Status:  Inpatient    1945 MRN [id number]   Location [facility] 3W/SW Attending David Mcdowell MD   Hosp Day # 5 PCP Fahad Erickson, DO       Assessment/Plan  Briefly, patient is a 77 yo woman with HTN, HDL, T2DM, hx of pericarditis s/p pericardial window, afib on eliquis s/p DCCV  who presented with symptoms of CHF, diuresed 4L cumulatively from admission with improvement in weight from 231lb to ~220 lbs.    Acute decompensated CHF; LVEF mildly reduced at 45-50%.  Paroxysmal atrial fibrillation, now in NSR    PLAN  -- discharge on lasix PO 40mg BID for 7 days until follow up with me in clinic.  -- Continue amio 400mg BID  -- Continue Eliquis 5mg BID  -- will have her follow up with me in clinic and get BMP.  -- continue home ARB     Subjective:   Has had good UOP. She states that the PO lasix 40mg blunted her UOP but she is urinating well.  She was net negative 200 ml yesterday. LE edema is much improved. She has less CALLAHAN.    Patient Active Problem List   Diagnosis    Hypertension    Hyperlipidemia    Obesity    Diabetes mellitus type 2 without retinopathy (HCC)    OAB (overactive bladder)    Spondylolisthesis, grade 1    Primary osteoarthritis of right hip    Acute pulmonary edema (HCC)    PAF (paroxysmal atrial fibrillation) (HCC)       Objective:   Temp: 98.2 °F (36.8 °C)  Pulse: 69  Resp: 20  BP: 122/50    Intake/Output:     Intake/Output Summary (Last 24 hours) at 1/3/2024 1320  Last data filed at 1/3/2024 1007  Gross per 24 hour   Intake 1190 ml   Output --   Net 1190 ml       Last 3 Weights   24 0415 217 lb 11.2 oz (98.7 kg)   24 0443 220 lb 12.8 oz (100.2 kg)   24 0500 226 lb 11.2 oz (102.8 kg)   23 0316 226 lb 9.6 oz (102.8 kg)   23 0055 231 lb 14.4 oz (105.2 kg)   22 1025 223 lb (101.2 kg)   22 1352 225 lb (102.1 kg)   22 1111 225 lb (102.1 kg)       Tele:  NSR    Physical Exam:    General: Alert and oriented x 3. No apparent distress. No respiratory or constitutional distress.  HEENT: Normocephalic, anicteric sclera, neck supple, no thyromegaly or adenopathy.  Neck: No JVD, carotids 2+, no bruits.  Cardiac: Regular rate and rhythm. S1, S2 normal. No murmur, pericardial rub, S3, thrill, heave or extra cardiac sounds.  Lungs: Clear without wheezes, rales, rhonchi or dullness.  Normal excursions and effort.  Abdomen: Soft, non-tender. No organosplenomegally, mass or rebound, BS-present.  Extremities: Without clubbing, cyanosis.  Peripheral pulses are 2+. +2 edema ankle  Neurologic: Alert and oriented, normal affect. No motor or coordinational deficit.  Skin: Warm and dry.     Laboratory/Data:    Labs:         Recent Labs   Lab 12/29/23 2104 12/30/23  0635   WBC 7.3 6.3   HGB 14.1 12.6   MCV 89.8 89.5   .0 244.0   INR 1.43*  --        Recent Labs   Lab 12/30/23  0635 12/31/23  0729 12/31/23  1622 01/01/24  0548 01/02/24  0603 01/02/24  1619 01/03/24  0656    134*  --  138 135*  --  138   K 3.7 3.5  3.5 4.9 4.0 3.6 4.8 4.0    101  --  101 102  --  101   CO2 34.0* 34.0*  --  36.0* 36.0*  --  33.0*   BUN 13 14  --  14 13  --  14   CREATSERUM 0.94 0.94  --  0.95 0.94  --  0.88   CA 8.9 8.9  --  9.0 8.9  --  9.2   MG 1.4* 1.5*  --  1.6 1.7  --  1.9   * 125*  --  129* 164*  --  149*       Recent Labs   Lab 12/29/23  2104 12/30/23  0635 01/03/24  0656   ALT 69* 53* 33   AST 29 25 35*   ALB 3.9 3.5 3.8       No results for input(s): \"TROP\" in the last 168 hours.    Allergies:   Allergies   Allergen Reactions    Erythromycin     Penicillins     Sulfa Antibiotics        Medications:        Derik Arguelles MD  01/03/24

## 2024-01-03 NOTE — CM/SW NOTE
01/03/24 1000   Discharge disposition   Expected discharge disposition Home-Health   Post Acute Care Provider Residential   Discharge transportation Private car     SW informed  provider of pt discharge and requested follow up with pt/family for SOC in the community.     MARIOLA Woodall, LSW  Social Work   Ext:#[phone]

## 2024-01-03 NOTE — PLAN OF CARE
Patient denies pain or shortness of breath. On room air. Patient ambulating in hallways without complaints. Plan is for discharge home this afternoon. Patient and family updated on plan of care.     Problem: Patient Centered Care  Goal: Patient preferences are identified and integrated in the patient's plan of care  Description: Interventions:  - What would you like us to know as we care for you? From Home alone   - Provide timely, complete, and accurate information to patient/family  - Incorporate patient and family knowledge, values, beliefs, and cultural backgrounds into the planning and delivery of care  - Encourage patient/family to participate in care and decision-making at the level they choose  - Honor patient and family perspectives and choices  Outcome: Progressing     Problem: Diabetes/Glucose Control  Goal: Glucose maintained within prescribed range  Description: INTERVENTIONS:  - Monitor Blood Glucose as ordered  - Assess for signs and symptoms of hyperglycemia and hypoglycemia  - Administer ordered medications to maintain glucose within target range  - Assess barriers to adequate nutritional intake and initiate nutrition consult as needed  - Instruct patient on self management of diabetes  Outcome: Progressing     Problem: Patient/Family Goals  Goal: Patient/Family Long Term Goal  Description: Patient's Long Term Goal: Go back home     Interventions:  - Transition to oral lasix   - See additional Care Plan goals for specific interventions  Outcome: Progressing  Goal: Patient/Family Short Term Goal  Description: Patient's Short Term Goal: Feel less short of breath     Interventions:   - IV lasix  -Strict I and O   -Daily weight   - See additional Care Plan goals for specific interventions  Outcome: Progressing     Problem: RESPIRATORY - ADULT  Goal: Achieves optimal ventilation and oxygenation  Description: INTERVENTIONS:  - Assess for changes in respiratory status  - Assess for changes in mentation and  behavior  - Position to facilitate oxygenation and minimize respiratory effort  - Oxygen supplementation based on oxygen saturation or ABGs  - Provide Smoking Cessation handout, if applicable  - Encourage broncho-pulmonary hygiene including cough, deep breathe, Incentive Spirometry  - Assess the need for suctioning and perform as needed  - Assess and instruct to report SOB or any respiratory difficulty  - Respiratory Therapy support as indicated  - Manage/alleviate anxiety  - Monitor for signs/symptoms of CO2 retention  Outcome: Progressing

## 2024-01-03 NOTE — PLAN OF CARE
Pt A/Ox4, one assist with walker. Denies any shortness of breath.     Problem: Patient Centered Care  Goal: Patient preferences are identified and integrated in the patient's plan of care  Description: Interventions:  - What would you like us to know as we care for you? From Home alone   - Provide timely, complete, and accurate information to patient/family  - Incorporate patient and family knowledge, values, beliefs, and cultural backgrounds into the planning and delivery of care  - Encourage patient/family to participate in care and decision-making at the level they choose  - Honor patient and family perspectives and choices  Outcome: Progressing     Problem: Diabetes/Glucose Control  Goal: Glucose maintained within prescribed range  Description: INTERVENTIONS:  - Monitor Blood Glucose as ordered  - Assess for signs and symptoms of hyperglycemia and hypoglycemia  - Administer ordered medications to maintain glucose within target range  - Assess barriers to adequate nutritional intake and initiate nutrition consult as needed  - Instruct patient on self management of diabetes  Outcome: Progressing     Problem: Patient/Family Goals  Goal: Patient/Family Long Term Goal  Description: Patient's Long Term Goal: Go back home     Interventions:  - Transition to oral lasix   - See additional Care Plan goals for specific interventions  Outcome: Progressing  Goal: Patient/Family Short Term Goal  Description: Patient's Short Term Goal: Feel less short of breath     Interventions:   - IV lasix  -Strict I and O   -Daily weight   - See additional Care Plan goals for specific interventions  Outcome: Progressing     Problem: RESPIRATORY - ADULT  Goal: Achieves optimal ventilation and oxygenation  Description: INTERVENTIONS:  - Assess for changes in respiratory status  - Assess for changes in mentation and behavior  - Position to facilitate oxygenation and minimize respiratory effort  - Oxygen supplementation based on oxygen  saturation or ABGs  - Provide Smoking Cessation handout, if applicable  - Encourage broncho-pulmonary hygiene including cough, deep breathe, Incentive Spirometry  - Assess the need for suctioning and perform as needed  - Assess and instruct to report SOB or any respiratory difficulty  - Respiratory Therapy support as indicated  - Manage/alleviate anxiety  - Monitor for signs/symptoms of CO2 retention  Outcome: Progressing

## 2024-01-11 ENCOUNTER — LAB ENCOUNTER (OUTPATIENT)
Dept: LAB | Facility: HOSPITAL | Age: 79
End: 2024-01-11
Attending: INTERNAL MEDICINE
Payer: MEDICARE

## 2024-01-11 DIAGNOSIS — I50.21 ACUTE SYSTOLIC HEART FAILURE (HCC): Primary | ICD-10-CM

## 2024-01-11 LAB
ANION GAP SERPL CALC-SCNC: 7 MMOL/L (ref 0–18)
BNP SERPL-MCNC: 101 PG/ML
BUN BLD-MCNC: 20 MG/DL (ref 9–23)
BUN/CREAT SERPL: 16.4 (ref 10–20)
CALCIUM BLD-MCNC: 10.1 MG/DL (ref 8.7–10.4)
CHLORIDE SERPL-SCNC: 98 MMOL/L (ref 98–112)
CO2 SERPL-SCNC: 34 MMOL/L (ref 21–32)
CREAT BLD-MCNC: 1.22 MG/DL
EGFRCR SERPLBLD CKD-EPI 2021: 45 ML/MIN/1.73M2 (ref 60–?)
FASTING STATUS PATIENT QL REPORTED: NO
GLUCOSE BLD-MCNC: 174 MG/DL (ref 70–99)
OSMOLALITY SERPL CALC.SUM OF ELEC: 295 MOSM/KG (ref 275–295)
POTASSIUM SERPL-SCNC: 3.6 MMOL/L (ref 3.5–5.1)
SODIUM SERPL-SCNC: 139 MMOL/L (ref 136–145)

## 2024-01-11 PROCEDURE — 80048 BASIC METABOLIC PNL TOTAL CA: CPT

## 2024-01-11 PROCEDURE — 83880 ASSAY OF NATRIURETIC PEPTIDE: CPT

## 2024-01-11 PROCEDURE — 36415 COLL VENOUS BLD VENIPUNCTURE: CPT

## (undated) DEVICE — Device

## (undated) DEVICE — LAWSON - GOWN SURG STD XL STL DISP

## (undated) DEVICE — LAWSON - DRAPE CHEST/BREAST77X122X136IN